# Patient Record
Sex: FEMALE | Race: OTHER | NOT HISPANIC OR LATINO | URBAN - METROPOLITAN AREA
[De-identification: names, ages, dates, MRNs, and addresses within clinical notes are randomized per-mention and may not be internally consistent; named-entity substitution may affect disease eponyms.]

---

## 2023-05-05 ENCOUNTER — OFFICE VISIT (OUTPATIENT)
Dept: PLASTIC SURGERY | Facility: CLINIC | Age: 23
End: 2023-05-05
Payer: COMMERCIAL

## 2023-05-05 DIAGNOSIS — O35.AXX1: Primary | ICD-10-CM

## 2023-05-05 PROCEDURE — 99203 OFFICE O/P NEW LOW 30 MIN: CPT | Mod: ,,, | Performed by: PLASTIC SURGERY

## 2023-05-05 PROCEDURE — 99203 PR OFFICE/OUTPT VISIT, NEW, LEVL III, 30-44 MIN: ICD-10-PCS | Mod: ,,, | Performed by: PLASTIC SURGERY

## 2023-05-05 NOTE — PROGRESS NOTES
During the prenatal visit, I reviewed with Zulema the general overview of cleft lip and palate care. Prior to her delivery, I have asked her to tour the hospital she plans to delivery her son and meet with the speech pathologists and the pediatricians who will take care of her baby. While touring the hospital, it would be good for Zulema to see the various bottles available to feed her son after he is born.    The timing of the lip repair is typically around 3-to-4 months of age depending on her son's overall health and weight gain. The priority for any  cleft patient is feeding and weight gain. There are a number of feeding systems/bottles available and I directed Zulema to the American Cleft Palate-Craniofacial Association's webpage to view the resources available to her. I have also entered a consult for our feeding specialist to meet with Zulema prior to the delivery of her boy.     Middle ear dysfunction is common in children with a cleft palate, and should her son have a cleft palate, our ENT surgeons will evaluate for middle ear dysfunction and the possible need for ear tubes. If tubes are needed, they will be placed at the time of the lip repair.     If her son is found to have a cleft palate, the palate surgery is performed around age 1.     We have a comprehensive Cleft Lip and Palate Team at the [facility]. As part of that team we have a craniofacial pediatrician, pediatric ENT, speech and language pathologists, a pediatric dentist, pediatric orthodontists, a , and a . Additionally, we have the full range of pediatric subspecialists should Zulema's son require additional care.     I asked her to contact me with questions pertaining to cleft lip and palate care, and to notify us when her son is born.

## 2023-05-08 ENCOUNTER — TELEPHONE (OUTPATIENT)
Dept: SPEECH THERAPY | Facility: HOSPITAL | Age: 23
End: 2023-05-08
Payer: COMMERCIAL

## 2023-05-15 ENCOUNTER — TELEPHONE (OUTPATIENT)
Dept: SPEECH THERAPY | Facility: HOSPITAL | Age: 23
End: 2023-05-15
Payer: COMMERCIAL

## 2023-05-25 ENCOUNTER — TELEPHONE (OUTPATIENT)
Dept: SPEECH THERAPY | Facility: HOSPITAL | Age: 23
End: 2023-05-25
Payer: COMMERCIAL

## 2023-05-30 ENCOUNTER — PATIENT MESSAGE (OUTPATIENT)
Dept: SPEECH THERAPY | Facility: HOSPITAL | Age: 23
End: 2023-05-30
Payer: COMMERCIAL

## 2023-05-30 ENCOUNTER — CLINICAL SUPPORT (OUTPATIENT)
Dept: SPEECH THERAPY | Facility: HOSPITAL | Age: 23
End: 2023-05-30
Attending: PLASTIC SURGERY
Payer: COMMERCIAL

## 2023-05-30 DIAGNOSIS — O35.AXX1: ICD-10-CM

## 2023-05-30 PROCEDURE — 99499 UNLISTED E&M SERVICE: CPT | Mod: 95,,, | Performed by: SPEECH-LANGUAGE PATHOLOGIST

## 2023-05-30 PROCEDURE — 99499 NO LOS: ICD-10-PCS | Mod: 95,,, | Performed by: SPEECH-LANGUAGE PATHOLOGIST

## 2023-05-30 NOTE — PROGRESS NOTES
The patient location is: [place]  The chief complaint leading to consultation is: pre- cleft lip    Visit type: audiovisual    Face to Face time with patient: 10 minutes  15 minutes of total time spent on the encounter, which includes face to face time and non-face to face time preparing to see the patient (eg, review of tests), Obtaining and/or reviewing separately obtained history, Documenting clinical information in the electronic or other health record, Independently interpreting results (not separately reported) and communicating results to the patient/family/caregiver, or Care coordination (not separately reported).         Each patient to whom he or she provides medical services by telemedicine is:  (1) informed of the relationship between the physician and patient and the respective role of any other health care provider with respect to management of the patient; and (2) notified that he or she may decline to receive medical services by telemedicine and may withdraw from such care at any time.    Notes:       Mrs. Angulo was seen for a pre-janae visit per Dr. Luis Meek, craniofacial surgeon, to provide information and support regarding  feeding, in particular, with infants with clefts.  She is expecting a boy (Stallings, due date 23) who has been found to have a L-sided cleft lip (unknown re: hard and soft palates) per [place].  They anticipate delivery at [facility] in [place].     Provided overview of oral structures and functioning during swallowing (and briefly, speech).  Breastfeeding not expected to be an option.     Reviewed the 4 commonly used specialty bottles (Tamy, Dr. Brown Specialty, Enfamil Cleft Palate Nurser, and [place]) and provided samples of each.  Provided the links to the ACPA and Dr. Ramos sites for videos re: how to use each (see links below).  Also provided the ACPA Family Resources link and briefly reviewed its contents.     ACPA:  American Cleft Palate  Association  Feeding:  https://acpa-f.org/acpa-family-services/family-resources/feeding-your-baby/feeding-your-baby-videos/  Family Resources:  https://acpa-f.org/acpa-family-services/family-resources/     Dr. Ramos Specialty Feeding System video  https://www.[employer].com/watch?v=AbByDg8JDrx     Reviewed general strategies for feeding, including  * elevated head position to help protect middle ears  * goal of completing feedings in 30 minutes or less  More specific strategies may be utilized once Stallings arrives and is assessed by inpatient SLPs.       Reviewed general course post discharge re: f/u with individual providers and Craniofacial Team.  Provided my contact information and advised that after discharge, I can see Chandrakant in person or virtually.  In the meantime, parents can contact with me with any additional questions that may arise.

## 2023-08-18 ENCOUNTER — HOSPITAL ENCOUNTER (EMERGENCY)
Facility: HOSPITAL | Age: 23
Discharge: HOME | End: 2023-08-19
Attending: EMERGENCY MEDICINE
Payer: COMMERCIAL

## 2023-08-18 DIAGNOSIS — I82.220 IVC THROMBOSIS: ICD-10-CM

## 2023-08-18 DIAGNOSIS — M54.50 LOW BACK PAIN, UNSPECIFIED BACK PAIN LATERALITY, UNSPECIFIED CHRONICITY, UNSPECIFIED WHETHER SCIATICA PRESENT: ICD-10-CM

## 2023-08-18 DIAGNOSIS — R06.02 SHORTNESS OF BREATH: ICD-10-CM

## 2023-08-18 DIAGNOSIS — R51.9 RECURRENT HEADACHE: Primary | ICD-10-CM

## 2023-08-18 PROBLEM — I82.90 ACUTE DEEP VEIN THROMBOSIS (DVT) OF NON-EXTREMITY VEIN: Status: ACTIVE | Noted: 2023-08-18

## 2023-08-18 LAB
ALBUMIN SERPL BCP-MCNC: 3.2 G/DL (ref 3.5–5.2)
ALP SERPL-CCNC: 92 U/L (ref 55–135)
ALT SERPL W/O P-5'-P-CCNC: 14 U/L (ref 10–44)
ANION GAP SERPL CALC-SCNC: 14 MMOL/L (ref 8–16)
AST SERPL-CCNC: 12 U/L (ref 10–40)
B-HCG UR QL: NEGATIVE
BACTERIA #/AREA URNS AUTO: ABNORMAL /HPF
BASOPHILS # BLD AUTO: 0.02 K/UL (ref 0–0.2)
BASOPHILS NFR BLD: 0.2 % (ref 0–1.9)
BILIRUB SERPL-MCNC: 0.4 MG/DL (ref 0.1–1)
BILIRUB UR QL STRIP: NEGATIVE
BUN SERPL-MCNC: 7 MG/DL (ref 6–20)
CALCIUM SERPL-MCNC: 9.5 MG/DL (ref 8.7–10.5)
CHLORIDE SERPL-SCNC: 98 MMOL/L (ref 95–110)
CLARITY UR REFRACT.AUTO: CLEAR
CO2 SERPL-SCNC: 26 MMOL/L (ref 23–29)
COLOR UR AUTO: YELLOW
CREAT SERPL-MCNC: 0.7 MG/DL (ref 0.5–1.4)
CTP QC/QA: YES
DIFFERENTIAL METHOD: ABNORMAL
EOSINOPHIL # BLD AUTO: 0.2 K/UL (ref 0–0.5)
EOSINOPHIL NFR BLD: 1.6 % (ref 0–8)
ERYTHROCYTE [DISTWIDTH] IN BLOOD BY AUTOMATED COUNT: 18.4 % (ref 11.5–14.5)
EST. GFR  (NO RACE VARIABLE): >60 ML/MIN/1.73 M^2
GLUCOSE SERPL-MCNC: 88 MG/DL (ref 70–110)
GLUCOSE UR QL STRIP: NEGATIVE
HCT VFR BLD AUTO: 29.7 % (ref 37–48.5)
HGB BLD-MCNC: 8.4 G/DL (ref 12–16)
HGB UR QL STRIP: ABNORMAL
IMM GRANULOCYTES # BLD AUTO: 0.11 K/UL (ref 0–0.04)
IMM GRANULOCYTES NFR BLD AUTO: 1.1 % (ref 0–0.5)
INR PPP: 1.2 (ref 0.8–1.2)
KETONES UR QL STRIP: NEGATIVE
LACTATE SERPL-SCNC: 1.3 MMOL/L (ref 0.5–2.2)
LEUKOCYTE ESTERASE UR QL STRIP: NEGATIVE
LYMPHOCYTES # BLD AUTO: 2.3 K/UL (ref 1–4.8)
LYMPHOCYTES NFR BLD: 24.1 % (ref 18–48)
MCH RBC QN AUTO: 21.4 PG (ref 27–31)
MCHC RBC AUTO-ENTMCNC: 28.3 G/DL (ref 32–36)
MCV RBC AUTO: 76 FL (ref 82–98)
MICROSCOPIC COMMENT: ABNORMAL
MONOCYTES # BLD AUTO: 0.7 K/UL (ref 0.3–1)
MONOCYTES NFR BLD: 7.2 % (ref 4–15)
NEUTROPHILS # BLD AUTO: 6.3 K/UL (ref 1.8–7.7)
NEUTROPHILS NFR BLD: 65.8 % (ref 38–73)
NITRITE UR QL STRIP: NEGATIVE
NRBC BLD-RTO: 0 /100 WBC
PH UR STRIP: 7 [PH] (ref 5–8)
PLATELET # BLD AUTO: 489 K/UL (ref 150–450)
PMV BLD AUTO: 8.8 FL (ref 9.2–12.9)
POTASSIUM SERPL-SCNC: 4.4 MMOL/L (ref 3.5–5.1)
PROT SERPL-MCNC: 8.2 G/DL (ref 6–8.4)
PROT UR QL STRIP: NEGATIVE
PROTHROMBIN TIME: 12.7 SEC (ref 9–12.5)
RBC # BLD AUTO: 3.92 M/UL (ref 4–5.4)
RBC #/AREA URNS AUTO: 12 /HPF (ref 0–4)
SODIUM SERPL-SCNC: 138 MMOL/L (ref 136–145)
SP GR UR STRIP: 1.01 (ref 1–1.03)
SQUAMOUS #/AREA URNS AUTO: 0 /HPF
URN SPEC COLLECT METH UR: ABNORMAL
WBC # BLD AUTO: 9.63 K/UL (ref 3.9–12.7)
WBC #/AREA URNS AUTO: 1 /HPF (ref 0–5)

## 2023-08-18 PROCEDURE — 87040 BLOOD CULTURE FOR BACTERIA: CPT | Mod: 59 | Performed by: PHYSICIAN ASSISTANT

## 2023-08-18 PROCEDURE — 96374 THER/PROPH/DIAG INJ IV PUSH: CPT

## 2023-08-18 PROCEDURE — 80053 COMPREHEN METABOLIC PANEL: CPT | Performed by: PHYSICIAN ASSISTANT

## 2023-08-18 PROCEDURE — 93005 ELECTROCARDIOGRAM TRACING: CPT

## 2023-08-18 PROCEDURE — 93010 EKG 12-LEAD: ICD-10-PCS | Mod: ,,, | Performed by: INTERNAL MEDICINE

## 2023-08-18 PROCEDURE — 87150 DNA/RNA AMPLIFIED PROBE: CPT | Mod: 59 | Performed by: PHYSICIAN ASSISTANT

## 2023-08-18 PROCEDURE — 85610 PROTHROMBIN TIME: CPT | Performed by: PHYSICIAN ASSISTANT

## 2023-08-18 PROCEDURE — 99285 EMERGENCY DEPT VISIT HI MDM: CPT | Mod: 25

## 2023-08-18 PROCEDURE — 99284 EMERGENCY DEPT VISIT MOD MDM: CPT | Mod: ,,, | Performed by: SURGERY

## 2023-08-18 PROCEDURE — 81001 URINALYSIS AUTO W/SCOPE: CPT | Performed by: PHYSICIAN ASSISTANT

## 2023-08-18 PROCEDURE — 83605 ASSAY OF LACTIC ACID: CPT | Performed by: PHYSICIAN ASSISTANT

## 2023-08-18 PROCEDURE — 81025 URINE PREGNANCY TEST: CPT | Performed by: PHYSICIAN ASSISTANT

## 2023-08-18 PROCEDURE — 99284 PR EMERGENCY DEPT VISIT,LEVEL IV: ICD-10-PCS | Mod: ,,, | Performed by: SURGERY

## 2023-08-18 PROCEDURE — 63600175 PHARM REV CODE 636 W HCPCS: Performed by: PHYSICIAN ASSISTANT

## 2023-08-18 PROCEDURE — 93010 ELECTROCARDIOGRAM REPORT: CPT | Mod: ,,, | Performed by: INTERNAL MEDICINE

## 2023-08-18 PROCEDURE — 85025 COMPLETE CBC W/AUTO DIFF WBC: CPT | Performed by: PHYSICIAN ASSISTANT

## 2023-08-18 RX ORDER — MORPHINE SULFATE 2 MG/ML
2 INJECTION, SOLUTION INTRAMUSCULAR; INTRAVENOUS
Status: COMPLETED | OUTPATIENT
Start: 2023-08-18 | End: 2023-08-18

## 2023-08-18 RX ORDER — WARFARIN SODIUM 5 MG/1
5 TABLET ORAL DAILY
COMMUNITY
Start: 2023-08-18 | End: 2023-09-03

## 2023-08-18 RX ORDER — ENOXAPARIN SODIUM 100 MG/ML
80 INJECTION SUBCUTANEOUS 2 TIMES DAILY
COMMUNITY
Start: 2023-08-18 | End: 2023-09-03 | Stop reason: SDUPTHER

## 2023-08-18 RX ORDER — LABETALOL 200 MG/1
200 TABLET, FILM COATED ORAL 2 TIMES DAILY
COMMUNITY
Start: 2023-07-12 | End: 2023-09-03

## 2023-08-18 RX ADMIN — IOHEXOL 65 ML: 350 INJECTION, SOLUTION INTRAVENOUS at 11:08

## 2023-08-18 RX ADMIN — MORPHINE SULFATE 2 MG: 2 INJECTION, SOLUTION INTRAMUSCULAR; INTRAVENOUS at 08:08

## 2023-08-18 NOTE — ED NOTES
Pt states would like a second opinion on blood clot, diagnosed by [facility]. Pts dad states, daughter gets short of breath quickly while walking. Pt had iv iron given.         Patient identifiers for Zulema Angulo 23 y.o. female checked and correct.  Chief Complaint   Patient presents with    blood clot     Dc'd from [facility] with vena cava blood clot on lovenox, wanting 2nd opinion     No past medical history on file.  Allergies reported: Review of patient's allergies indicates:  Not on File    Appearance: Pt awake, alert & oriented to person, place & time. Pt in no acute distress at present time. Pt is clean and well groomed with clothes appropriately fastened.   Skin: Skin warm, dry & intact. Color consistent with ethnicity. Mucous membranes moist. No breakdown or brusing noted.   Musculoskeletal: Patient moving all extremities well, no obvious swelling or deformities noted.   Respiratory: Respirations spontaneous, even, and non-labored. Visible chest rise noted. Airway is open and patent. No accessory muscle use noted.   Neurologic: Sensation is intact. Speech is clear and appropriate. Eyes open spontaneously, behavior appropriate to situation, follows commands, facial expression symmetrical, bilateral hand grasp equal and even, purposeful motor response noted.  Cardiac: All peripheral pulses present. No Bilateral lower extremity edema. Cap refill is <3 seconds.  Abdomen: Abdomen soft, non distended, non tender to palpation.  : Pt voids independently, denies dysuria, hematuria, frequency.

## 2023-08-18 NOTE — ED PROVIDER NOTES
Encounter Date: 8/18/2023       History     Chief Complaint   Patient presents with    blood clot     Dc'd from [facility] with vena cava blood clot on lovenox, wanting 2nd opinion     23-year-old female with no pertinent past medical history who is currently 4 weeks postpartum presents to ED with recurrent headaches, shortness of breath, lower back pain x2 weeks.  Discharged from [facility] [place] [facility] today after 6 day stay for extensive IVC blood clot. She is on heparin and coumadin at this time. She also reports intermittent fevers over the past week, Tmax of 103.  Shortness of breath is with exertion.  States that she cannot walk short distances without getting winded. CTA chest performed on 8/2/2023 negative for PE during her stay. Denies worsening of her symptoms since diagnosis. Still is having headaches and lower back pain. Denies abdominal pain, nausea, vomiting, visual changes, weakness, paresthesias, saddle anesthesia.    The history is provided by the patient.     Review of patient's allergies indicates:  Not on File  History reviewed. No pertinent past medical history.  History reviewed. No pertinent surgical history.  History reviewed. No pertinent family history.     Review of Systems   Constitutional:  Positive for appetite change and fever.   Eyes:  Negative for visual disturbance.   Respiratory:  Positive for shortness of breath.    Cardiovascular:  Negative for chest pain.   Gastrointestinal:  Negative for abdominal pain, nausea and vomiting.   Musculoskeletal:  Positive for back pain.   Neurological:  Positive for headaches. Negative for dizziness.       Physical Exam     Initial Vitals [08/18/23 1444]   BP Pulse Resp Temp SpO2   133/63 100 18 98.5 °F (36.9 °C) 98 %      MAP       --         Physical Exam    Nursing note and vitals reviewed.  Constitutional: She appears well-developed and well-nourished. She is not diaphoretic. No distress.   HENT:   Head: Normocephalic and atraumatic.    Nose: Nose normal.   Eyes: Conjunctivae and EOM are normal.   Neck: Neck supple.   Cardiovascular:  Normal rate, regular rhythm, normal heart sounds and intact distal pulses.           Pulmonary/Chest: Breath sounds normal. No respiratory distress.   Abdominal: Abdomen is soft. Bowel sounds are normal. She exhibits no distension. A surgical scar is present. There is no abdominal tenderness.    scar over lower abdomen.  No signs of infection including erythema, tenderness, drainage There is no guarding.   Musculoskeletal:      Cervical back: Neck supple.      Comments: No midline T spine or L spine ttp     Neurological: She is alert and oriented to person, place, and time. Gait normal.   Skin: No rash noted.   Psychiatric: She has a normal mood and affect. Her behavior is normal. Judgment and thought content normal.         ED Course   Procedures  Labs Reviewed   CBC W/ AUTO DIFFERENTIAL - Abnormal; Notable for the following components:       Result Value    RBC 3.92 (*)     Hemoglobin 8.4 (*)     Hematocrit 29.7 (*)     MCV 76 (*)     MCH 21.4 (*)     MCHC 28.3 (*)     RDW 18.4 (*)     Platelets 489 (*)     MPV 8.8 (*)     Immature Granulocytes 1.1 (*)     Immature Grans (Abs) 0.11 (*)     All other components within normal limits   COMPREHENSIVE METABOLIC PANEL - Abnormal; Notable for the following components:    Albumin 3.2 (*)     All other components within normal limits   URINALYSIS, REFLEX TO URINE CULTURE - Abnormal; Notable for the following components:    Occult Blood UA 1+ (*)     All other components within normal limits    Narrative:     Specimen Source->Urine   PROTIME-INR - Abnormal; Notable for the following components:    Prothrombin Time 12.7 (*)     All other components within normal limits   URINALYSIS MICROSCOPIC - Abnormal; Notable for the following components:    RBC, UA 12 (*)     All other components within normal limits    Narrative:     Specimen Source->Urine   CULTURE, BLOOD    CULTURE, BLOOD   LACTIC ACID, PLASMA   HIV 1 / 2 ANTIBODY   HEPATITIS C ANTIBODY   POCT URINE PREGNANCY     EKG Readings: (Independently Interpreted)   Rhythm: Sinus Tachycardia. Heart Rate: 104. Axis: Right Axis Deviation. Clinical Impression: Sinus Tachycardia       Imaging Results              X-Ray Chest PA And Lateral (Final result)  Result time 08/18/23 18:56:31      Final result by Olivier Thomas MD (08/18/23 18:56:31)                   Impression:      No acute process.      Electronically signed by: Olivier Thomas MD  Date:    08/18/2023  Time:    18:56               Narrative:    EXAMINATION:  XR CHEST PA AND LATERAL    CLINICAL HISTORY:  Shortness of breath    TECHNIQUE:  PA and lateral views of the chest were performed.    COMPARISON:  None    FINDINGS:  The trachea is unremarkable.  The cardiothymic silhouette is within normal limits.  The hilar structures are unremarkable.  There is no evidence of free air beneath the hemidiaphragms.  There are no pleural effusions.  There is no evidence of a pneumothorax.  There is no evidence of pneumomediastinum.  No airspace opacity is present.  The osseous structures are unremarkable.                                       MRV Brain Without Contrast (Final result)  Result time 08/18/23 18:52:52   Procedure changed from MRV Brain With & W/O Contrast     Final result by Olivier Thomas MD (08/18/23 18:52:52)                   Impression:      Normal MRV of the brain.    Electronically signed by resident: Laurent Briggs  Date:    08/18/2023  Time:    18:37    Electronically signed by: Olivier Thomas MD  Date:    08/18/2023  Time:    18:52               Narrative:    EXAMINATION:  MRV BRAIN WITHOUT CONTRAST    CLINICAL HISTORY:  Dural venous sinus thrombosis suspected;Recurrent headaches. dx with IVF clot on 8/12. concern for venous sinus thrombus;    TECHNIQUE:  Noncontrast 2-D time-of-flight MRV of the head with coronal and sagittal source imaging and 3-dimensional MIP  projection views.    COMPARISON:  None    FINDINGS:  The superior sagittal sinus is patent.  The inferior sagittal sinus is hypoplastic, likely a developmental variant.  The paired internal cerebral veins are patent.  The vein of Diaz and torcula Herophili are patent.  The straight sinus is patent.  The bilateral transverse and sigmoid dural venous sinuses are patent to the jugular foramen.                                       Medications   morphine injection 2 mg (2 mg Intravenous Given 8/18/23 2012)     Medical Decision Making  23-year-old female currently 4 weeks postpartum presents to ED with recurrent headaches, shortness of breath, lower back pain x2 weeks.  Nontoxic appearing. Hemodynamically stable. Oriented x3. Neuro intact.  Afebrile.  Exam as above.  Will obtain imaging and labs and reassess.    Differential diagnosis includes IVC thrombosis, venous sinus thrombus, symptomatic anemia, pulmonary embolism, sepsis    - Labs without leukocytosis.  Normal lactate.  Do not suspect sepsis at this time.  Blood cultures obtained and will be followed  - Hemoglobin of 8.4 though this is improved from yesterday (hemoglobin of 7.2)  - Urine does not appear to be infected.  Negative for leukocytes or nitrites.  1+ blood.  Patient denies urinary tract symptoms  - MRV brain negative for venous sinus thrombus or other acute intracranial abnormalities.  - Vascular surgery consulted and evaulated pt in the ed. Recommends CT PA to evaluate for PE.  Will sign out to attending physician Dr. Pinon pending CTA chest.  Dispo pending results. Vascular surgery plans to follow up patient in clinic if imaging is unremarkable.      Amount and/or Complexity of Data Reviewed  Independent Historian: parent and spouse     Details: Father/spouse is here with her providing part of history  External Data Reviewed: labs and notes.     Details: Discharged from [facility] today after being diagnosed with IVF clot on CT AP. On  ASHLY Slade heparin and Coumadin currently.  CT chest negative for PE. MRI L spine negative. CT head on 8/10 negative  Labs: ordered. Decision-making details documented in ED Course.  Radiology: ordered. Decision-making details documented in ED Course.  ECG/medicine tests:  Decision-making details documented in ED Course.    Risk  Prescription drug management.    .            ED Course as of 08/18/23 2231   Fri Aug 18, 2023   1745 Hemoglobin(!): 8.4 [HM]   1745 WBC: 9.63 [HM]   1806 Lactate, Tom: 1.3 [HM]      ED Course User Index  [HM] Maria Dolores Smith PA-C                    Clinical Impression:   Final diagnoses:  [R06.02] Shortness of breath  [R51.9] Recurrent headache (Primary)  [M54.50] Low back pain, unspecified back pain laterality, unspecified chronicity, unspecified whether sciatica present  [I82.220] IVC thrombosis               Maria Dolores Smith PA-C  08/18/23 2231

## 2023-08-19 VITALS
SYSTOLIC BLOOD PRESSURE: 128 MMHG | WEIGHT: 170 LBS | RESPIRATION RATE: 18 BRPM | HEIGHT: 58 IN | DIASTOLIC BLOOD PRESSURE: 61 MMHG | HEART RATE: 92 BPM | OXYGEN SATURATION: 96 % | BODY MASS INDEX: 35.68 KG/M2 | TEMPERATURE: 98 F

## 2023-08-19 PROCEDURE — 25500020 PHARM REV CODE 255: Performed by: PHYSICIAN ASSISTANT

## 2023-08-19 NOTE — HPI
Zulema Angulo is a 23 year old female who is 4 weeks postpartum who presents today for second opinion for her IVC thrombus. She presented to a hospital in [place] on 8/12 for migraines and back pain. 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 CTA PE was negative at that time. She was hospitalized and started on heparin which was transitioned to lovenox to bridge to coumadin. She was discharged this morning, however she was concerned because she was becoming very short of breath after walking about 100 ft and wanted a second opinion. She denies a history of blood clots. She only takes labetalol for her HTN during her pregnancy. She reports some lower leg swelling, but this has been present since her pregnancy and is not worse. She denies pain in her legs/feet as well as numbness and weakness.

## 2023-08-19 NOTE — ASSESSMENT & PLAN NOTE
23 year old female 4 weeks postpartum who presents today for second opinion for her IVC thrombus. Imaging at [facility] on 8/12 showed VC thrombus from the suprarenal IVC to the bilateral common illiac veins per the read (unable to view images). CTA PE was negative at that time. She presents today after discharge to obtain a second opinion. Also complaining of shortness of breath with ambulation. She is currently on lovenox for treatment.    - Recommend CTA PE if clinical concern for PE, although patient is already on treatment  - Will have patient follow up as an outpatient with images to review and discuss possible thrombectomy. No indication for acute intervention.  - Recommend continued anticoagulation  - Elevate legs and wear compression stockings to help with leg swelling.

## 2023-08-19 NOTE — PROGRESS NOTES
Received patient at [facility].    24 y/o F PMH recent IVC clot diagnosis already on acx pending vascular consult and imaging.    -MRV negative  -Vascular saw patient, rec CTA PE. No other interventions, they will see her in their clinic  -CTA PE negative  -Patient reassessed, she is stable, doing well, informed of plan. Family and patient in agreement, referral placed, discharged home, continue meds, strict return precautions given, she expressed understanding.

## 2023-08-19 NOTE — CONSULTS
[facility] - Emergency Dept  Vascular Surgery  Consult Note    Inpatient consult to Vascular Surgery  Consult performed by: Serina Cueva MD  Consult ordered by: Maria Dolores Smith PA-C        Subjective:     Chief Complaint/Reason for Admission: IVC thrombus    History of Present Illness: Zulema Angulo is a 23 year old female who is 4 weeks postpartum who presents today for second opinion for her IVC thrombus. She presented to a hospital in [place] on 8/12 for migraines and back pain. 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 CTA PE was negative at that time. She was hospitalized and started on heparin which was transitioned to lovenox to bridge to coumadin. She was discharged this morning, however she was concerned because she was becoming very short of breath after walking about 100 ft and wanted a second opinion. She denies a history of blood clots. She only takes labetalol for her HTN during her pregnancy. She reports some lower leg swelling, but this has been present since her pregnancy and is not worse. She denies pain in her legs/feet as well as numbness and weakness.            (Not in a hospital admission)      Review of patient's allergies indicates:  Not on File    History reviewed. No pertinent past medical history.  History reviewed. No pertinent surgical history.  Family History    None       Tobacco Use    Smoking status: Not on file    Smokeless tobacco: Not on file   Substance and Sexual Activity    Alcohol use: Not on file    Drug use: Not on file    Sexual activity: Not on file     Review of Systems   Constitutional:  Negative for chills, fatigue and fever.   Respiratory:  Positive for shortness of breath. Negative for cough.    Cardiovascular:  Positive for leg swelling. Negative for chest pain and palpitations.   Gastrointestinal:   Negative for abdominal pain.   Musculoskeletal:  Negative for back pain.   Skin:  Negative for color change and rash.   Neurological:  Positive for headaches. Negative for weakness and numbness.     Objective:     Vital Signs (Most Recent):  Temp: 98.8 °F (37.1 °C) (08/18/23 2007)  Pulse: 93 (08/18/23 2007)  Resp: 16 (08/18/23 2012)  BP: 115/73 (08/18/23 2007)  SpO2: 99 % (08/18/23 2007) Vital Signs (24h Range):  Temp:  [98.5 °F (36.9 °C)-99.3 °F (37.4 °C)] 98.8 °F (37.1 °C)  Pulse:  [] 93  Resp:  [16-20] 16  SpO2:  [98 %-99 %] 99 %  BP: (115-133)/(63-73) 115/73     Weight: 77.1 kg (170 lb)  Body mass index is 35.53 kg/m².      Physical Exam  Vitals and nursing note reviewed.   Constitutional:       General: She is not in acute distress.  HENT:      Head: Normocephalic and atraumatic.      Mouth/Throat:      Mouth: Mucous membranes are moist.   Cardiovascular:      Rate and Rhythm: Normal rate and regular rhythm.      Pulses: Normal pulses.   Pulmonary:      Effort: Pulmonary effort is normal. No respiratory distress.   Musculoskeletal:      Right lower leg: Edema (mild) present.      Left lower leg: Edema (mild) present.   Skin:     General: Skin is warm and dry.   Neurological:      General: No focal deficit present.      Mental Status: She is alert and oriented to person, place, and time.          Significant Labs:  CBC:   Recent Labs   Lab 08/18/23  1714   WBC 9.63   RBC 3.92*   HGB 8.4*   HCT 29.7*   *   MCV 76*   MCH 21.4*   MCHC 28.3*     CMP:   Recent Labs   Lab 08/18/23  1714   GLU 88   CALCIUM 9.5   ALBUMIN 3.2*   PROT 8.2      K 4.4   CO2 26   CL 98   BUN 7   CREATININE 0.7   ALKPHOS 92   ALT 14   AST 12   BILITOT 0.4     All pertinent labs from the last 24 hours have been reviewed.    Significant Diagnostics:  I have reviewed all pertinent imaging results/findings within the past 24 hours.    Assessment/Plan:     Acute deep vein thrombosis (DVT) of non-extremity vein  23 year old  female 4 weeks postpartum who presents today for second opinion for her IVC thrombus. Imaging at [facility] on 8/12 showed VC thrombus from the suprarenal IVC to the bilateral common illiac veins per the read (unable to view images). CTA PE was negative at that time. She presents today after discharge to obtain a second opinion. Also complaining of shortness of breath with ambulation. She is currently on lovenox for treatment.    - Recommend CTA PE if clinical concern for PE, although patient is already on treatment  - Will have patient follow up as an outpatient with images to review and discuss possible thrombectomy. No indication for acute intervention.  - Recommend continued anticoagulation  - Elevate legs and wear compression stockings to help with leg swelling.        Thank you for your consult. I will sign off. Please contact us if you have any additional questions.    Serina Cueva MD  Vascular Surgery  [facility] - Emergency Dept

## 2023-08-19 NOTE — SUBJECTIVE & OBJECTIVE
(Not in a hospital admission)      Review of patient's allergies indicates:  Not on File    History reviewed. No pertinent past medical history.  History reviewed. No pertinent surgical history.  Family History    None       Tobacco Use    Smoking status: Not on file    Smokeless tobacco: Not on file   Substance and Sexual Activity    Alcohol use: Not on file    Drug use: Not on file    Sexual activity: Not on file     Review of Systems   Constitutional:  Negative for chills, fatigue and fever.   Respiratory:  Positive for shortness of breath. Negative for cough.    Cardiovascular:  Positive for leg swelling. Negative for chest pain and palpitations.   Gastrointestinal:  Negative for abdominal pain.   Musculoskeletal:  Negative for back pain.   Skin:  Negative for color change and rash.   Neurological:  Positive for headaches. Negative for weakness and numbness.     Objective:     Vital Signs (Most Recent):  Temp: 98.8 °F (37.1 °C) (08/18/23 2007)  Pulse: 93 (08/18/23 2007)  Resp: 16 (08/18/23 2012)  BP: 115/73 (08/18/23 2007)  SpO2: 99 % (08/18/23 2007) Vital Signs (24h Range):  Temp:  [98.5 °F (36.9 °C)-99.3 °F (37.4 °C)] 98.8 °F (37.1 °C)  Pulse:  [] 93  Resp:  [16-20] 16  SpO2:  [98 %-99 %] 99 %  BP: (115-133)/(63-73) 115/73     Weight: 77.1 kg (170 lb)  Body mass index is 35.53 kg/m².      Physical Exam  Vitals and nursing note reviewed.   Constitutional:       General: She is not in acute distress.  HENT:      Head: Normocephalic and atraumatic.      Mouth/Throat:      Mouth: Mucous membranes are moist.   Cardiovascular:      Rate and Rhythm: Normal rate and regular rhythm.      Pulses: Normal pulses.   Pulmonary:      Effort: Pulmonary effort is normal. No respiratory distress.   Musculoskeletal:      Right lower leg: Edema (mild) present.      Left lower leg: Edema (mild) present.   Skin:     General: Skin is warm and dry.   Neurological:      General: No focal deficit present.      Mental Status: She  is alert and oriented to person, place, and time.          Significant Labs:  CBC:   Recent Labs   Lab 08/18/23  1714   WBC 9.63   RBC 3.92*   HGB 8.4*   HCT 29.7*   *   MCV 76*   MCH 21.4*   MCHC 28.3*     CMP:   Recent Labs   Lab 08/18/23  1714   GLU 88   CALCIUM 9.5   ALBUMIN 3.2*   PROT 8.2      K 4.4   CO2 26   CL 98   BUN 7   CREATININE 0.7   ALKPHOS 92   ALT 14   AST 12   BILITOT 0.4     All pertinent labs from the last 24 hours have been reviewed.    Significant Diagnostics:  I have reviewed all pertinent imaging results/findings within the past 24 hours.

## 2023-08-21 LAB
MRSA ID BY PCR: NEGATIVE
STAPH AUREUS ID BY PCR: NEGATIVE

## 2023-08-23 LAB
BACTERIA BLD CULT: ABNORMAL
BACTERIA BLD CULT: NORMAL

## 2023-09-02 ENCOUNTER — HOSPITAL ENCOUNTER (OUTPATIENT)
Facility: HOSPITAL | Age: 23
Discharge: HOME | End: 2023-09-03
Attending: EMERGENCY MEDICINE | Admitting: STUDENT IN AN ORGANIZED HEALTH CARE EDUCATION/TRAINING PROGRAM
Payer: COMMERCIAL

## 2023-09-02 DIAGNOSIS — R00.0 TACHYCARDIA: ICD-10-CM

## 2023-09-02 DIAGNOSIS — R50.9 FEVER, UNSPECIFIED FEVER CAUSE: Primary | ICD-10-CM

## 2023-09-02 DIAGNOSIS — R07.9 CHEST PAIN: ICD-10-CM

## 2023-09-02 DIAGNOSIS — I82.220 IVC THROMBOSIS: ICD-10-CM

## 2023-09-02 LAB
ALBUMIN SERPL BCP-MCNC: 3.6 G/DL (ref 3.5–5.2)
ALP SERPL-CCNC: 84 U/L (ref 55–135)
ALT SERPL W/O P-5'-P-CCNC: 22 U/L (ref 10–44)
ANION GAP SERPL CALC-SCNC: 11 MMOL/L (ref 8–16)
AST SERPL-CCNC: 16 U/L (ref 10–40)
B-HCG UR QL: NEGATIVE
BACTERIA #/AREA URNS AUTO: ABNORMAL /HPF
BASOPHILS # BLD AUTO: 0.01 K/UL (ref 0–0.2)
BASOPHILS NFR BLD: 0.1 % (ref 0–1.9)
BILIRUB SERPL-MCNC: 0.7 MG/DL (ref 0.1–1)
BILIRUB UR QL STRIP: NEGATIVE
BUN SERPL-MCNC: 8 MG/DL (ref 6–20)
CALCIUM SERPL-MCNC: 9.2 MG/DL (ref 8.7–10.5)
CHLORIDE SERPL-SCNC: 104 MMOL/L (ref 95–110)
CLARITY UR REFRACT.AUTO: ABNORMAL
CO2 SERPL-SCNC: 23 MMOL/L (ref 23–29)
COLOR UR AUTO: YELLOW
CREAT SERPL-MCNC: 0.8 MG/DL (ref 0.5–1.4)
CTP QC/QA: YES
DIFFERENTIAL METHOD: ABNORMAL
EOSINOPHIL # BLD AUTO: 0 K/UL (ref 0–0.5)
EOSINOPHIL NFR BLD: 0.5 % (ref 0–8)
ERYTHROCYTE [DISTWIDTH] IN BLOOD BY AUTOMATED COUNT: 20.8 % (ref 11.5–14.5)
EST. GFR  (NO RACE VARIABLE): >60 ML/MIN/1.73 M^2
GLUCOSE SERPL-MCNC: 98 MG/DL (ref 70–110)
GLUCOSE UR QL STRIP: NEGATIVE
HCT VFR BLD AUTO: 32.4 % (ref 37–48.5)
HCV AB SERPL QL IA: NORMAL
HGB BLD-MCNC: 9.6 G/DL (ref 12–16)
HGB UR QL STRIP: NEGATIVE
HIV 1+2 AB+HIV1 P24 AG SERPL QL IA: NORMAL
IMM GRANULOCYTES # BLD AUTO: 0.04 K/UL (ref 0–0.04)
IMM GRANULOCYTES NFR BLD AUTO: 0.5 % (ref 0–0.5)
INR PPP: 1.3 (ref 0.8–1.2)
KETONES UR QL STRIP: NEGATIVE
LEUKOCYTE ESTERASE UR QL STRIP: ABNORMAL
LYMPHOCYTES # BLD AUTO: 2.1 K/UL (ref 1–4.8)
LYMPHOCYTES NFR BLD: 24.9 % (ref 18–48)
MCH RBC QN AUTO: 23.1 PG (ref 27–31)
MCHC RBC AUTO-ENTMCNC: 29.6 G/DL (ref 32–36)
MCV RBC AUTO: 78 FL (ref 82–98)
MICROSCOPIC COMMENT: ABNORMAL
MONOCYTES # BLD AUTO: 0.7 K/UL (ref 0.3–1)
MONOCYTES NFR BLD: 8.2 % (ref 4–15)
NEUTROPHILS # BLD AUTO: 5.4 K/UL (ref 1.8–7.7)
NEUTROPHILS NFR BLD: 65.8 % (ref 38–73)
NITRITE UR QL STRIP: NEGATIVE
NRBC BLD-RTO: 0 /100 WBC
PH UR STRIP: 6 [PH] (ref 5–8)
PLATELET # BLD AUTO: 335 K/UL (ref 150–450)
PMV BLD AUTO: 8.9 FL (ref 9.2–12.9)
POTASSIUM SERPL-SCNC: 3.6 MMOL/L (ref 3.5–5.1)
PROT SERPL-MCNC: 7.6 G/DL (ref 6–8.4)
PROT UR QL STRIP: NEGATIVE
PROTHROMBIN TIME: 14.1 SEC (ref 9–12.5)
RBC # BLD AUTO: 4.16 M/UL (ref 4–5.4)
RBC #/AREA URNS AUTO: 1 /HPF (ref 0–4)
SARS-COV-2 RDRP RESP QL NAA+PROBE: NEGATIVE
SODIUM SERPL-SCNC: 138 MMOL/L (ref 136–145)
SP GR UR STRIP: 1.02 (ref 1–1.03)
SQUAMOUS #/AREA URNS AUTO: 14 /HPF
URN SPEC COLLECT METH UR: ABNORMAL
WBC # BLD AUTO: 8.26 K/UL (ref 3.9–12.7)
WBC #/AREA URNS AUTO: 7 /HPF (ref 0–5)

## 2023-09-02 PROCEDURE — 86803 HEPATITIS C AB TEST: CPT | Performed by: PHYSICIAN ASSISTANT

## 2023-09-02 PROCEDURE — 99285 EMERGENCY DEPT VISIT HI MDM: CPT | Mod: 25

## 2023-09-02 PROCEDURE — 85610 PROTHROMBIN TIME: CPT | Performed by: PHYSICIAN ASSISTANT

## 2023-09-02 PROCEDURE — U0002 COVID-19 LAB TEST NON-CDC: HCPCS | Performed by: PHYSICIAN ASSISTANT

## 2023-09-02 PROCEDURE — 93010 ELECTROCARDIOGRAM REPORT: CPT | Mod: ,,, | Performed by: INTERNAL MEDICINE

## 2023-09-02 PROCEDURE — 93010 EKG 12-LEAD: ICD-10-PCS | Mod: ,,, | Performed by: INTERNAL MEDICINE

## 2023-09-02 PROCEDURE — 93005 ELECTROCARDIOGRAM TRACING: CPT

## 2023-09-02 PROCEDURE — 80053 COMPREHEN METABOLIC PANEL: CPT | Performed by: PHYSICIAN ASSISTANT

## 2023-09-02 PROCEDURE — 87389 HIV-1 AG W/HIV-1&-2 AB AG IA: CPT | Performed by: PHYSICIAN ASSISTANT

## 2023-09-02 PROCEDURE — 85025 COMPLETE CBC W/AUTO DIFF WBC: CPT | Performed by: PHYSICIAN ASSISTANT

## 2023-09-02 PROCEDURE — 81025 URINE PREGNANCY TEST: CPT | Performed by: PHYSICIAN ASSISTANT

## 2023-09-02 PROCEDURE — 81001 URINALYSIS AUTO W/SCOPE: CPT | Performed by: PHYSICIAN ASSISTANT

## 2023-09-02 RX ADMIN — IOHEXOL 75 ML: 350 INJECTION, SOLUTION INTRAVENOUS at 11:09

## 2023-09-03 VITALS
RESPIRATION RATE: 18 BRPM | HEIGHT: 58 IN | DIASTOLIC BLOOD PRESSURE: 90 MMHG | OXYGEN SATURATION: 99 % | BODY MASS INDEX: 33.58 KG/M2 | TEMPERATURE: 98 F | WEIGHT: 160 LBS | HEART RATE: 96 BPM | SYSTOLIC BLOOD PRESSURE: 161 MMHG

## 2023-09-03 PROBLEM — R65.10 SIRS (SYSTEMIC INFLAMMATORY RESPONSE SYNDROME): Status: ACTIVE | Noted: 2023-09-03

## 2023-09-03 PROBLEM — I82.220 IVC THROMBOSIS: Status: ACTIVE | Noted: 2023-09-03

## 2023-09-03 PROBLEM — R03.0 ELEVATED BLOOD PRESSURE READING: Status: ACTIVE | Noted: 2023-09-03

## 2023-09-03 LAB
ESTIMATED AVG GLUCOSE: 85 MG/DL (ref 68–131)
HBA1C MFR BLD: 4.6 % (ref 4–5.6)
INR PPP: 1.3 (ref 0.8–1.2)
PROTHROMBIN TIME: 13.8 SEC (ref 9–12.5)
TROPONIN I SERPL DL<=0.01 NG/ML-MCNC: <0.006 NG/ML (ref 0–0.03)
TSH SERPL DL<=0.005 MIU/L-ACNC: 1.53 UIU/ML (ref 0.4–4)

## 2023-09-03 PROCEDURE — 25500020 PHARM REV CODE 255: Performed by: EMERGENCY MEDICINE

## 2023-09-03 PROCEDURE — 84484 ASSAY OF TROPONIN QUANT: CPT

## 2023-09-03 PROCEDURE — 99499 UNLISTED E&M SERVICE: CPT | Mod: ,,, | Performed by: HOSPITALIST

## 2023-09-03 PROCEDURE — 63600175 PHARM REV CODE 636 W HCPCS: Performed by: EMERGENCY MEDICINE

## 2023-09-03 PROCEDURE — 85610 PROTHROMBIN TIME: CPT

## 2023-09-03 PROCEDURE — 84443 ASSAY THYROID STIM HORMONE: CPT

## 2023-09-03 PROCEDURE — 83036 HEMOGLOBIN GLYCOSYLATED A1C: CPT

## 2023-09-03 PROCEDURE — 25000003 PHARM REV CODE 250

## 2023-09-03 PROCEDURE — 25000003 PHARM REV CODE 250: Performed by: STUDENT IN AN ORGANIZED HEALTH CARE EDUCATION/TRAINING PROGRAM

## 2023-09-03 PROCEDURE — 96372 THER/PROPH/DIAG INJ SC/IM: CPT | Performed by: EMERGENCY MEDICINE

## 2023-09-03 PROCEDURE — 99223 PR INITIAL HOSPITAL CARE,LEVL III: ICD-10-PCS | Mod: ,,, | Performed by: HOSPITALIST

## 2023-09-03 PROCEDURE — 99223 1ST HOSP IP/OBS HIGH 75: CPT | Mod: ,,, | Performed by: HOSPITALIST

## 2023-09-03 PROCEDURE — G0378 HOSPITAL OBSERVATION PER HR: HCPCS

## 2023-09-03 PROCEDURE — 99499 NO LOS: ICD-10-PCS | Mod: ,,, | Performed by: HOSPITALIST

## 2023-09-03 RX ORDER — IBUPROFEN 200 MG
16 TABLET ORAL
Status: DISCONTINUED | OUTPATIENT
Start: 2023-09-03 | End: 2023-09-03 | Stop reason: HOSPADM

## 2023-09-03 RX ORDER — NALOXONE HCL 0.4 MG/ML
0.02 VIAL (ML) INJECTION
Status: DISCONTINUED | OUTPATIENT
Start: 2023-09-03 | End: 2023-09-03 | Stop reason: HOSPADM

## 2023-09-03 RX ORDER — IBUPROFEN 200 MG
24 TABLET ORAL
Status: DISCONTINUED | OUTPATIENT
Start: 2023-09-03 | End: 2023-09-03 | Stop reason: HOSPADM

## 2023-09-03 RX ORDER — ENOXAPARIN SODIUM 100 MG/ML
1 INJECTION SUBCUTANEOUS EVERY 12 HOURS
Status: DISCONTINUED | OUTPATIENT
Start: 2023-09-03 | End: 2023-09-03

## 2023-09-03 RX ORDER — TALC
6 POWDER (GRAM) TOPICAL NIGHTLY PRN
Status: DISCONTINUED | OUTPATIENT
Start: 2023-09-03 | End: 2023-09-03 | Stop reason: HOSPADM

## 2023-09-03 RX ORDER — HYDROCODONE BITARTRATE AND ACETAMINOPHEN 5; 325 MG/1; MG/1
1 TABLET ORAL
Status: COMPLETED | OUTPATIENT
Start: 2023-09-03 | End: 2023-09-03

## 2023-09-03 RX ORDER — LABETALOL 100 MG/1
200 TABLET, FILM COATED ORAL EVERY 12 HOURS
Status: DISCONTINUED | OUTPATIENT
Start: 2023-09-03 | End: 2023-09-03

## 2023-09-03 RX ORDER — IPRATROPIUM BROMIDE 0.5 MG/2.5ML
0.5 SOLUTION RESPIRATORY (INHALATION) EVERY 6 HOURS
Status: DISCONTINUED | OUTPATIENT
Start: 2023-09-03 | End: 2023-09-03

## 2023-09-03 RX ORDER — SODIUM CHLORIDE 0.9 % (FLUSH) 0.9 %
10 SYRINGE (ML) INJECTION EVERY 12 HOURS PRN
Status: DISCONTINUED | OUTPATIENT
Start: 2023-09-03 | End: 2023-09-03 | Stop reason: HOSPADM

## 2023-09-03 RX ORDER — APIXABAN 5 MG (74)
KIT ORAL
Qty: 74 TABLET | Refills: 0 | Status: ON HOLD | OUTPATIENT
Start: 2023-09-03 | End: 2023-09-13 | Stop reason: HOSPADM

## 2023-09-03 RX ORDER — ACETAMINOPHEN 325 MG/1
650 TABLET ORAL EVERY 8 HOURS PRN
Status: DISCONTINUED | OUTPATIENT
Start: 2023-09-03 | End: 2023-09-03 | Stop reason: HOSPADM

## 2023-09-03 RX ORDER — LABETALOL 100 MG/1
200 TABLET, FILM COATED ORAL 2 TIMES DAILY
Status: DISCONTINUED | OUTPATIENT
Start: 2023-09-03 | End: 2023-09-03

## 2023-09-03 RX ORDER — GLUCAGON 1 MG
1 KIT INJECTION
Status: DISCONTINUED | OUTPATIENT
Start: 2023-09-03 | End: 2023-09-03 | Stop reason: HOSPADM

## 2023-09-03 RX ORDER — ACETAMINOPHEN 325 MG/1
650 TABLET ORAL
Status: COMPLETED | OUTPATIENT
Start: 2023-09-03 | End: 2023-09-03

## 2023-09-03 RX ORDER — ENOXAPARIN SODIUM 100 MG/ML
80 INJECTION SUBCUTANEOUS
Status: COMPLETED | OUTPATIENT
Start: 2023-09-03 | End: 2023-09-03

## 2023-09-03 RX ORDER — ENOXAPARIN SODIUM 100 MG/ML
80 INJECTION SUBCUTANEOUS EVERY 12 HOURS
Qty: 4 EACH | Refills: 0 | Status: SHIPPED | OUTPATIENT
Start: 2023-09-03 | End: 2023-09-03 | Stop reason: HOSPADM

## 2023-09-03 RX ADMIN — HYDROCODONE BITARTRATE AND ACETAMINOPHEN 1 TABLET: 5; 325 TABLET ORAL at 12:09

## 2023-09-03 RX ADMIN — ENOXAPARIN SODIUM 80 MG: 80 INJECTION SUBCUTANEOUS at 12:09

## 2023-09-03 RX ADMIN — ACETAMINOPHEN 650 MG: 325 TABLET ORAL at 12:09

## 2023-09-03 RX ADMIN — ACETAMINOPHEN 650 MG: 325 TABLET ORAL at 05:09

## 2023-09-03 RX ADMIN — APIXABAN 10 MG: 5 TABLET, FILM COATED ORAL at 11:09

## 2023-09-03 NOTE — H&P
"[facility] - Emergency Dept  [facility] Medicine  History & Physical    Patient Name: Zulema Angulo  MRN: [id number]  Patient Class: OP- Observation  Admission Date: 2023  Attending Physician: Sofy Salgado MD   Primary Care Provider: Abram Merrill MD         Patient information was obtained from patient, spouse/SO and ER records.     Subjective:     Principal Problem:<principal problem not specified>    Chief Complaint:   Chief Complaint   Patient presents with    Fever     States dx with a blood clot to vena cava x 2 weeks ago and was told to come to hospital if she started having fever" states dx at [facility] in [place] and started on blood thinners        HPI: Mrs Angulo is a 23 y.o. female presents who is a known case of IVC clot hat completed 7 days of Lovenox and was on warfarin (following up in hem/onc opd) who presented with worsening lower abdominal pain that radiates to the back in association with fever (100.0)  In the absence of cough, sputum, changes in urinary or bowel habits, or LOC nor seizures.  Patient denied SOB when questioned.  She was told by her treating team to present to ED in case of fever.  She describes her lower abdominal pain as constant and dull in  nature with a severity of 6/10 that is worsened upon movement and radiated to her lower leg.  Patient is able to ambulate.  Her most recent delivery was her last  that was birthed 6 weeks ago after which she got her tubes ligated.  For some reason her heme/onc team only supplied her Lovenox for 7 days and told her to continue on warfarin, however, her INR is subtherapeutic today.  She was scheduled  for an appointment with vascular surgery team  CT was done at the ED today which showed progression of the IVC clot. Vascular team has been consulted for the AM.    CT at the ED:  Extensive thrombus formation extending from the suprarenal IVC to the bilateral common iliac veins.  Interval progression of thrombus " formation within the left external iliac and common femoral veins.  Trace endometrial and endocervical fluid and enhancement were present on 08/12/2023 but appear more prominent today.  Although physiologic or normal postsurgical changes are possible, careful clinical correlation is advised to help exclude endometritis or other infectious process.  Neoplasm less likely.  Correlate clinically.  Stranding in the intrapelvic fat is possibly also postoperative or related to small vessel congestion secondary to the iliocaval DVT.  However, PID could present with some similar findings.  Correlate clinically.  Trace free fluid within the pelvis.      No past medical history on file.    No past surgical history on file.    Review of patient's allergies indicates:  No Known Allergies    No current facility-administered medications on file prior to encounter.     Current Outpatient Medications on File Prior to Encounter   Medication Sig    labetaloL (NORMODYNE) 200 MG tablet Take 200 mg by mouth 2 (two) times daily.    warfarin (COUMADIN) 5 MG tablet Take 5 mg by mouth Daily.    [DISCONTINUED] enoxaparin (LOVENOX) 80 mg/0.8 mL Syrg Inject 80 mg into the skin 2 (two) times daily.     Family History    None       Tobacco Use    Smoking status: Not on file    Smokeless tobacco: Not on file   Substance and Sexual Activity    Alcohol use: Not on file    Drug use: Not on file    Sexual activity: Not on file     Review of Systems   Constitutional:  Negative for activity change.   Respiratory:  Negative for apnea, choking, chest tightness, shortness of breath and wheezing.    Cardiovascular:  Negative for chest pain, palpitations and leg swelling.   Gastrointestinal:  Positive for abdominal pain (lower abdominal pain, 6/10, dull.). Negative for constipation, diarrhea and vomiting.   Genitourinary:  Negative for dysuria, flank pain, frequency, hematuria and urgency.   Musculoskeletal:  Positive for back pain.   Neurological:  Negative.    Psychiatric/Behavioral: Negative.       Objective:     Vital Signs (Most Recent):  Temp: 99.6 °F (37.6 °C) (09/03/23 0043)  Pulse: 78 (09/03/23 0430)  Resp: 15 (09/03/23 0430)  BP: 124/73 (09/03/23 0430)  SpO2: 97 % (09/03/23 0430) Vital Signs (24h Range):  Temp:  [98.5 °F (36.9 °C)-99.6 °F (37.6 °C)] 99.6 °F (37.6 °C)  Pulse:  [] 78  Resp:  [14-20] 15  SpO2:  [97 %-100 %] 97 %  BP: (115-130)/(60-84) 124/73     Weight: 72.6 kg (160 lb)  Body mass index is 33.44 kg/m².     Physical Exam  HENT:      Head: Normocephalic and atraumatic.   Eyes:      Pupils: Pupils are equal, round, and reactive to light.   Cardiovascular:      Rate and Rhythm: Normal rate and regular rhythm.      Pulses: Normal pulses.      Heart sounds: Normal heart sounds. No murmur heard.  Pulmonary:      Effort: Pulmonary effort is normal. No respiratory distress.      Breath sounds: Normal breath sounds. No wheezing.   Abdominal:      General: Abdomen is flat. Bowel sounds are normal.      Tenderness: There is abdominal tenderness (lower abdominal pain).   Skin:     General: Skin is warm.   Neurological:      General: No focal deficit present.      Mental Status: She is alert and oriented to person, place, and time. Mental status is at baseline.   Psychiatric:         Mood and Affect: Mood normal.         Behavior: Behavior normal.         Thought Content: Thought content normal.         Judgment: Judgment normal.              CRANIAL NERVES     CN III, IV, VI   Pupils are equal, round, and reactive to light.       Significant Labs: All pertinent labs within the past 24 hours have been reviewed.    Significant Imaging: I have reviewed all pertinent imaging results/findings within the past 24 hours.    Assessment/Plan:     Elevated blood pressure reading        Acute deep vein thrombosis (DVT) of non-extremity vein  Patient will be placed on enoxaparin 0.8 sc bid    Plan:  Vascular consultation ordered  Continue enoxaparin 0.8 sc  bid  Hold warfarin           VTE Risk Mitigation (From admission, onward)         Ordered     enoxaparin injection 80 mg  Every 12 hours         09/03/23 0453     IP VTE HIGH RISK PATIENT  Once         09/03/23 0453     Place sequential compression device  Until discontinued         09/03/23 0453                       On 09/03/2023, patient should be placed in hospital observation services under my care in collaboration with Dr. Dayday Sargent.    Maria M Ron MD  Department of Hospital Medicine  [facility] - Emergency Dept

## 2023-09-03 NOTE — CONSULTS
[facility]  Vascular Surgery  Consult Note    Inpatient consult to Vascular Surgery  Consult performed by: Kip Barajas MD  Consult ordered by: Sofy Salgado MD        Subjective:     Chief Complaint/Reason for Admission: IVC thrombus    History of Present Illness: Zulema Angulo is a 23 year old female who is 6 weeks postpartum who presents to the emergency department with complaint of lower abdominal pain, migraines, and subjective fever (<100.4).     She initially presented to a hospital in [place] on 8/12 for migraines and back pain. 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 CTA PE was negative at that time. She was hospitalized and started on heparin which was transitioned to lovenox to bridge to coumadin (due to plan for breast feeding).     Since previous evaluation, the patient denies any leg swelling or difficulty breathing. A repeat CT scan was performed today which shows extension of thrombus into left external iliac and common femoral veins. Her INR has been subtherapeutic at outpatient follow up, her dose of coumadin was increased, INR remains subtherapeutic at 1.4 today. The patient is no longer breast feeding.     Denies numbness or weakness of lower extremities.       (Not in a hospital admission)      Review of patient's allergies indicates:  No Known Allergies    No past medical history on file.  No past surgical history on file.  Family History    None       Tobacco Use    Smoking status: Not on file    Smokeless tobacco: Not on file   Substance and Sexual Activity    Alcohol use: Not on file    Drug use: Not on file    Sexual activity: Not on file     Review of Systems   Constitutional:  Positive for fever (subjective, no value >100). Negative for chills and fatigue.   Respiratory:  Negative for shortness of breath.     Cardiovascular:  Negative for chest pain.   Gastrointestinal:  Positive for abdominal pain (lower abdomen).   Skin:  Negative for color change and wound.   Neurological:  Positive for headaches.   All other systems reviewed and are negative.    Objective:     Vital Signs (Most Recent):  Temp: 98.1 °F (36.7 °C) (09/03/23 0800)  Pulse: 87 (09/03/23 0732)  Resp: 18 (09/03/23 0732)  BP: 112/68 (09/03/23 0732)  SpO2: 99 % (09/03/23 0732) Vital Signs (24h Range):  Temp:  [98.1 °F (36.7 °C)-99.6 °F (37.6 °C)] 98.1 °F (36.7 °C)  Pulse:  [] 87  Resp:  [13-20] 18  SpO2:  [94 %-100 %] 99 %  BP: (109-130)/(60-84) 112/68     Weight: 72.6 kg (160 lb)  Body mass index is 33.44 kg/m².      Physical Exam  Constitutional:       Appearance: Normal appearance.   HENT:      Head: Normocephalic and atraumatic.   Cardiovascular:      Rate and Rhythm: Normal rate and regular rhythm.   Pulmonary:      Effort: Pulmonary effort is normal.   Abdominal:      General: Abdomen is flat.      Tenderness: There is no abdominal tenderness.   Musculoskeletal:         General: No swelling.      Left lower leg: No edema.   Skin:     General: Skin is warm.      Capillary Refill: Capillary refill takes less than 2 seconds.   Neurological:      General: No focal deficit present.      Mental Status: She is alert and oriented to person, place, and time.          Significant Labs:  Recent Lab Results  (Last 5 results in the past 24 hours)        09/03/23  0841   09/03/23  0504   09/02/23  1950   09/02/23  1945   09/02/23  1942        Appearance, UA       Hazy         Bacteria, UA       Few         Bilirubin (UA)       Negative         Color, UA       Yellow         Estimated Avg Glucose   85             Glucose, UA       Negative         Hemoglobin A1C External   4.6  Comment: ADA Screening Guidelines:  5.7-6.4%  Consistent with prediabetes  >or=6.5%  Consistent with diabetes    High levels of fetal hemoglobin interfere with the HbA1C  assay.  Heterozygous hemoglobin variants (HbS, HgC, etc)do  not significantly interfere with this assay.   However, presence of multiple variants may affect accuracy.               INR 1.3  Comment: Coumadin Therapy:  2.0 - 3.0 for INR for all indicators except mechanical heart valves  and antiphospholipid syndromes which should use 2.5 - 3.5.                 Ketones, UA       Negative         Leukocytes, UA       Trace         Microscopic Comment       SEE COMMENT  Comment: Other formed elements not mentioned in the report are not   present in the microscopic examination.            NITRITE UA       Negative         Occult Blood UA       Negative         pH, UA       6.0         Preg Test, Ur     Negative           Protein, UA       Negative  Comment: Recommend a 24 hour urine protein or a urine   protein/creatinine ratio if globulin induced proteinuria is  clinically suspected.           [place] 13.8                Acceptable     Yes           RBC, UA       1         SARS-CoV-2 RNA, Amplification, Qual         Negative  Comment: This test utilizes isothermal nucleic acid amplification technology   to   detect the SARS-CoV-2 RdRp nucleic acid segment. The analytical   sensitivity   (limit of detection) is 500 copies/swab.     A POSITIVE result is indicative of the presence of SARS-CoV-2 RNA;   clinical   correlation with patient history and other diagnostic information is   necessary to determine patient infection status.    A NEGATIVE result means that SARS-CoV-2 nucleic acids are not present   above   the limit of detection. A NEGATIVE result should be treated as   presumptive.   It does not rule out the possibility of COVID-19 and should not be   the sole   basis for treatment decisions. If COVID-19 is strongly suspected   based on   clinical and exposure history, re-testing using an alternate   molecular assay   should be considered.     This test is only for use under the Food and Drug Administration s    Emergency   Use Authorization (EUA).     Commercial kits are provided by [employer]. Performance   characteristics of the EUA have been independently verified by   [facility] Department of Pathology and Laboratory Medicine.   _________________________________________________________________   The authorized Fact Sheet for Healthcare Providers and the authorized   Fact   Sheet for Patients of the ID NOW COVID-19 are available on the FDA   website:   https://www.fda.gov/media/968142/download   https://www.fda.gov/media/006563/download         Specific Gravity, UA       1.020         Specimen UA       Urine, Clean Catch         Squam Epithel, UA       14         Troponin I   <0.006  Comment: The reference interval for Troponin I represents the 99th percentile   cutoff   for our facility and is consistent with 3rd generation assay   performance.               TSH   1.530             WBC, UA       7                                Significant Diagnostics:  I have reviewed all pertinent imaging results/findings within the past 24 hours.    Assessment/Plan:     * IVC thrombosis  [age] year old woman now 6 weeks postpartum with history of suprarenal IVC thrombus extending down to the right common iliac and left common femoral vein.  At this point, the thrombus is subacute, greater than 2 weeks since initial diagnosis.  Extension of thrombus from previous evaluation as a result of subtherapeutic range of INR while on Coumadin.      No exam or history findings concerning for limb threat    Recommend transitioning anticoagulation to Eliquis, 10 mg b.i.d. for 1 week followed by 5 mg b.i.d. for at least 3 months.      Patient to follow up with Dr. Pate in 1-2 weeks with a repeat CT scan to evaluate thrombus burden  No indication for acute intervention at this time   Rest of care per primary team        Thank you for your consult. I will sign off. Please contact us if you have any additional  questions.    ELEUTERIO GARLAND MD  Vascular Surgery  [facility] Dept

## 2023-09-03 NOTE — ASSESSMENT & PLAN NOTE
[age] year old woman now 6 weeks postpartum with history of suprarenal IVC thrombus extending down to the right common iliac and left common femoral vein.  At this point, the thrombus is subacute, greater than 2 weeks since initial diagnosis.  Extension of thrombus from previous evaluation as a result of subtherapeutic range of INR while on Coumadin.      No exam or history findings concerning for limb threat    Recommend transitioning anticoagulation to Eliquis, 10 mg b.i.d. for 1 week followed by 5 mg b.i.d. for at least 3 months.      Patient to follow up with Dr. Ahn in 1-2 weeks with a repeat CT scan to evaluate thrombus burden  No indication for acute intervention at this time   Rest of care per primary team

## 2023-09-03 NOTE — SUBJECTIVE & OBJECTIVE
No past medical history on file.    No past surgical history on file.    Review of patient's allergies indicates:  No Known Allergies    No current facility-administered medications on file prior to encounter.     Current Outpatient Medications on File Prior to Encounter   Medication Sig    labetaloL (NORMODYNE) 200 MG tablet Take 200 mg by mouth 2 (two) times daily.    warfarin (COUMADIN) 5 MG tablet Take 5 mg by mouth Daily.    [DISCONTINUED] enoxaparin (LOVENOX) 80 mg/0.8 mL Syrg Inject 80 mg into the skin 2 (two) times daily.     Family History    None       Tobacco Use    Smoking status: Not on file    Smokeless tobacco: Not on file   Substance and Sexual Activity    Alcohol use: Not on file    Drug use: Not on file    Sexual activity: Not on file     Review of Systems   Constitutional:  Negative for activity change.   Respiratory:  Negative for apnea, choking, chest tightness, shortness of breath and wheezing.    Cardiovascular:  Negative for chest pain, palpitations and leg swelling.   Gastrointestinal:  Positive for abdominal pain (lower abdominal pain, 6/10, dull.). Negative for constipation, diarrhea and vomiting.   Genitourinary:  Negative for dysuria, flank pain, frequency, hematuria and urgency.   Musculoskeletal:  Positive for back pain.   Neurological: Negative.    Psychiatric/Behavioral: Negative.       Objective:     Vital Signs (Most Recent):  Temp: 99.6 °F (37.6 °C) (09/03/23 0043)  Pulse: 78 (09/03/23 0430)  Resp: 15 (09/03/23 0430)  BP: 124/73 (09/03/23 0430)  SpO2: 97 % (09/03/23 0430) Vital Signs (24h Range):  Temp:  [98.5 °F (36.9 °C)-99.6 °F (37.6 °C)] 99.6 °F (37.6 °C)  Pulse:  [] 78  Resp:  [14-20] 15  SpO2:  [97 %-100 %] 97 %  BP: (115-130)/(60-84) 124/73     Weight: 72.6 kg (160 lb)  Body mass index is 33.44 kg/m².     Physical Exam  HENT:      Head: Normocephalic and atraumatic.   Eyes:      Pupils: Pupils are equal, round, and reactive to light.   Cardiovascular:      Rate and  Rhythm: Normal rate and regular rhythm.      Pulses: Normal pulses.      Heart sounds: Normal heart sounds. No murmur heard.  Pulmonary:      Effort: Pulmonary effort is normal. No respiratory distress.      Breath sounds: Normal breath sounds. No wheezing.   Abdominal:      General: Abdomen is flat. Bowel sounds are normal.      Tenderness: There is abdominal tenderness (lower abdominal pain).   Skin:     General: Skin is warm.   Neurological:      General: No focal deficit present.      Mental Status: She is alert and oriented to person, place, and time. Mental status is at baseline.   Psychiatric:         Mood and Affect: Mood normal.         Behavior: Behavior normal.         Thought Content: Thought content normal.         Judgment: Judgment normal.              CRANIAL NERVES     CN III, IV, VI   Pupils are equal, round, and reactive to light.       Significant Labs: All pertinent labs within the past 24 hours have been reviewed.    Significant Imaging: I have reviewed all pertinent imaging results/findings within the past 24 hours.

## 2023-09-03 NOTE — PHARMACY MED REC
"Admission Medication History     The home medication history was taken by Sal Pérez.    You may go to "Admission" then "Reconcile Home Medications" tabs to review and/or act upon these items.     The home medication list has been updated by the Pharmacy department.   Please read ALL comments highlighted in yellow.   Please address this information as you see fit.    Feel free to contact us if you have any questions or require assistance.      PATIENT WAS TAKING LABETALOL 200 MG AND WARFARIN 5 MG PRIOR TO ED VISIT. MD DISCONTINUED AT DISCHARGE.        Potential issues to be addressed PRIOR TO DISCHARGE  Patient requires education regarding drug therapies     Sal Pérez  EXT [id number]                .          "

## 2023-09-03 NOTE — ED NOTES
Report received from [facility] paramedic. Assumed care of pt at this time. VSS, RR even and unlabored. Resting in bed comfortably. No voiced compaints of pain or discomfort at this time. Safety protocols remain. Verified lines/leads attatched to patient at this time.      General: Awake and alert:  Neck: Supple  Respiratory: Nonlabored respirations. No audible wheeze. Speaking in full sentences.  Cardiac: Well-perfused. No acrocyanosis.  Abdomen: Supple  Neurological: Moves all extremities symmetrically and equally. Answers questions appropriately.

## 2023-09-03 NOTE — ASSESSMENT & PLAN NOTE
Patient will be placed on enoxaparin 0.8 sc bid    Plan:  Vascular consultation ordered  Continue enoxaparin 0.8 sc bid  Hold warfarin

## 2023-09-03 NOTE — PLAN OF CARE
[facility]  Initial Discharge Assessment       Primary Care Provider: Abram Merrill MD    Admission Diagnosis: Fever, unspecified fever cause [R50.9]    Admission Date: 9/2/2023  Expected Discharge Date: 9/3/2023    Pt stated she is independent with her ADL's and ambulation and does not require assistance or equipment.    Pt spouse at bedside.  Pt to d/c home with no needs when ready    Transition of Care Barriers: (P) None    Payor: BLUE CROSS BLUE SHIELD / Plan: BCBS ALL OUT OF STATE / Product Type: PPO /     Extended Emergency Contact Information  Primary Emergency Contact: Josias Angulo  Mobile Phone: [phone]  Relation: Spouse  Preferred language: English   needed? No    Discharge Plan A: (P) Home  Discharge Plan B: (P) Home    No Pharmacies Listed    Initial Assessment (most recent)       Adult Discharge Assessment - 09/03/23 1034          Discharge Assessment    Assessment Type Discharge Planning Assessment (P)      Confirmed/corrected address, phone number and insurance Yes (P)      Confirmed Demographics Correct on Facesheet (P)      Source of Information patient (P)      Reason For Admission IVC thrombosis (P)      People in Home spouse;child(frances), dependent (P)      Facility Arrived From: home (P)      Do you expect to return to your current living situation? Yes (P)      Do you have help at home or someone to help you manage your care at home? No (P)      Prior to hospitilization cognitive status: Alert/Oriented;No Deficits (P)      Current cognitive status: Alert/Oriented;No Deficits (P)      Home Accessibility stairs to enter home (P)      Number of Stairs, Main Entrance one (P)      Home Layout Able to live on 1st floor (P)      Equipment Currently Used at Home none (P)      Patient currently being followed by outpatient case management? No (P)      Do you currently have service(s) that help you manage your care at home? No (P)      Do you have any problems affording  any of your prescribed medications? No (P)      Is the patient taking medications as prescribed? yes (P)      Who is going to help you get home at discharge? fmaily/friends (P)      How do you get to doctors appointments? car, drives self;family or friend will provide (P)      Are you on dialysis? No (P)      Do you take coumadin? Yes (P)      Who monitors your labs? Dr Tompkins - 1 x week (P)      DME Needed Upon Discharge  none (P)      Discharge Plan discussed with: Patient (P)      Transition of Care Barriers None (P)      Discharge Plan A Home (P)      Discharge Plan B Home (P)         Transportation Needs    In the past 12 months, has lack of transportation kept you from medical appointments or from getting medications? No (P)      In the past 12 months, has lack of transportation kept you from meetings, work, or from getting things needed for daily living? No (P)         Food Insecurity    Within the past 12 months, you worried that your food would run out before you got the money to buy more. Never true (P)      Within the past 12 months, the food you bought just didn't last and you didn't have money to get more. Never true (P)         Social Connections    In a typical week, how many times do you talk on the phone with family, friends, or neighbors? More than three times a week (P)      How often do you get together with friends or relatives? More than three times a week (P)      How often do you attend Catholic or Mu-ism services? More than 4 times per year (P)      Do you belong to any clubs or organizations such as Catholic groups, unions, fraternal or athletic groups, or school groups? No (P)      How often do you attend meetings of the clubs or organizations you belong to? Never (P)      Are you , , , , never , or living with a partner?  (P)         Alcohol Use    Q1: How often do you have a drink containing alcohol? Never (P)      Q2: How many drinks  containing alcohol do you have on a typical day when you are drinking? Patient does not drink (P)      Q3: How often do you have six or more drinks on one occasion? Never (P)         OTHER    Name(s) of People in Home spouse Josias and dependent children 18mo and 6weeks (P)                       Lillie Benson CD, MSW, LMSW, RSW   Case Management  [facility]  Email: [email or web address]

## 2023-09-03 NOTE — FIRST PROVIDER EVALUATION
" Emergency Department TeleTriage Encounter Note      CHIEF COMPLAINT    Chief Complaint   Patient presents with    Fever     States dx with a blood clot to vena cava x 2 weeks ago and was told to come to hospital if she started having fever" states dx at [facility] in [place] and started on blood thinners       VITAL SIGNS   Initial Vitals [09/02/23 1905]   BP Pulse Resp Temp SpO2   118/84 (!) 113 18 98.5 °F (36.9 °C) 99 %      MAP       --            ALLERGIES    Review of patient's allergies indicates:  No Known Allergies    PROVIDER TRIAGE NOTE  Patient was dx with clot in the vena cava and is on anticoagulants. She has now has pain in the lower left back and left leg. She had temp 100 degrees today. No chest pain or shortness of breath. Reports headache. Reports both legs feel weak when she walks.       ORDERS  Labs Reviewed   HIV 1 / 2 ANTIBODY   HEPATITIS C ANTIBODY       ED Orders (720h ago, onward)      Start Ordered     Status Ordering Provider    09/02/23 1907 09/02/23 1906  HIV 1/2 Ag/Ab (4th Gen)  STAT         Ordered KENDALL ANGULO    09/02/23 1907 09/02/23 1906  Hepatitis C Antibody  STAT         Ordered KENDALL ANGULO              Virtual Visit Note: The provider triage portion of this emergency department evaluation and documentation was performed via linkedFA, a HIPAA-compliant telemedicine application, in concert with a tele-presenter in the room. A face to face patient evaluation with one of my colleagues will occur once the patient is placed in an emergency department room.      DISCLAIMER: This note was prepared with [employer] voice recognition transcription software. Garbled syntax, mangled pronouns, and other bizarre constructions may be attributed to that software system.    "

## 2023-09-03 NOTE — ED TRIAGE NOTES
"Zulema ZACKERY Angulo, a 23 y.o. female presents to the ED w/ complaint of fever, increased SOB and dizziness upon walking, and L side pain radiating to L leg. 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     Triage note:  Chief Complaint   Patient presents with    Fever     States dx with a blood clot to vena cava x 2 weeks ago and was told to come to hospital if she started having fever" states dx at [facility] in [place] and started on blood thinners     Review of patient's allergies indicates:  No Known Allergies  No past medical history on file.    "

## 2023-09-03 NOTE — PROVIDER PROGRESS NOTES - EMERGENCY DEPT.
Encounter Date: 9/2/2023    ED Physician Progress Notes            Received patient in sign-out.  CT of abdomen/pelvis was notable for patient's known IVC thrombus but with no extension into the external iliacs.  Given this worsening thrombus burden despite patient being on outpatient anticoagulation, feel that she requires inpatient admission further evaluation and anticoagulation management.  Discussed with the patient.  Discussed with hospital medicine who will admit the patient for further treatment.  Hospital medicine requested a vascular surgery consult which the order was placed.    Electronically signed by:  Joan Yates MD  9/3/2023

## 2023-09-03 NOTE — HOSPITAL COURSE
23-year-old female with recent diagnosis of a vena cava thrombus following  in mid July presents with a fever. She was taking Lovenox for 7 days but has recently transitioned to Warfarin. She came to the ED with a low-grade fever per her instructions if she develops fever in the setting of acute DVT. During her stay, her Warfarin levels were found to be sub-therapeutic. Repeat CT today showed extensive thrombus formation extending from the suprarenal IVC to the bilateral common iliac veins.  Interval progression of thrombus formation within the left external iliac and common femoral veins. Vascular surgery was consulted and evaluated her. Vascular decided to switch her from Warfarin to Eliquis. She is currently scheduled to follow up with Vascular surgery outpatient in a week and future plans for thrombectomy.

## 2023-09-03 NOTE — DISCHARGE INSTRUCTIONS
Follow up with your doctor and vascular as previously scheduled.    Return to ED for abdominal pain, vomiting, persistent fever or any other concerns.

## 2023-09-03 NOTE — ED PROVIDER NOTES
"Encounter Date: 2023       History     Chief Complaint   Patient presents with    Fever     States dx with a blood clot to vena cava x 2 weeks ago and was told to come to hospital if she started having fever" states dx at [facility] in [place] and started on blood thinners     HPI  23-year-old female with recent diagnosis of a vena cava thrombus following  in mid July presents with a fever.  Per patient's mother she has been on Lovenox in his transitioning to Coumadin and generally has been doing better but today noted a fever and some left lower quadrant abdominal pain and a were concerned because they were told to come back if the patient had a fever.  Patient states the left-sided abdominal pain was similar to the symptoms she had prior to being diagnosed with the vena cava thrombus.  Patient states she is been told she is a carrier for factor 5 Leiden.  Per mother pt was on lovenox and is transitioning to coumadin. Pt has an appt with vascular surgery in the next week or so  Has had some mild nausea but no emesis.  No other focal symptoms including headache, sore throat, cough, shortness of breath, dysuria and vaginal discharge.  She is no longer breastfeeding.  No breast tenderness.  No other sick at home.    Review of patient's allergies indicates:  No Known Allergies    PMH: vena cava thrombus, iron deficiency anemia  PSH:  and tubal ligation, 2023    No family history on file.     Review of Systems   Constitutional:  Positive for fever. Negative for fatigue.   HENT:  Negative for sore throat.    Respiratory:  Negative for cough and shortness of breath.    Cardiovascular:  Negative for chest pain and leg swelling.   Gastrointestinal:  Positive for abdominal pain and nausea. Negative for abdominal distention and vomiting.   Genitourinary:  Negative for dysuria, flank pain, urgency, vaginal bleeding and vaginal discharge.   Musculoskeletal:  Negative for back pain.   Skin:  " Negative for rash.       Physical Exam     Initial Vitals [23 1905]   BP Pulse Resp Temp SpO2   118/84 (!) 113 18 98.5 °F (36.9 °C) 99 %      MAP       --         Physical Exam    Nursing note and vitals reviewed.  Constitutional: She appears well-developed and well-nourished. She is not diaphoretic. No distress.   HENT:   Head: Normocephalic and atraumatic.   Eyes: Conjunctivae are normal. Pupils are equal, round, and reactive to light.   Neck: Neck supple.   Cardiovascular:  Regular rhythm, normal heart sounds and intact distal pulses.           tachycardia   Pulmonary/Chest: Breath sounds normal. No respiratory distress. She has no wheezes. She has no rales.   No breast engorgement   Abdominal: Abdomen is soft. She exhibits no distension.   Well healed  incision site, minimal ttp in llq, no rebound or guarding, no CVAT   Musculoskeletal:         General: No tenderness or edema.      Cervical back: Neck supple.     Neurological: She is alert and oriented to person, place, and time.   Skin: Skin is warm and dry.         ED Course   Procedures  Labs Reviewed   CBC W/ AUTO DIFFERENTIAL - Abnormal; Notable for the following components:       Result Value    Hemoglobin 9.6 (*)     Hematocrit 32.4 (*)     MCV 78 (*)     MCH 23.1 (*)     MCHC 29.6 (*)     RDW 20.8 (*)     MPV 8.9 (*)     All other components within normal limits   PROTIME-INR - Abnormal; Notable for the following components:    Prothrombin Time 14.1 (*)     INR 1.3 (*)     All other components within normal limits   URINALYSIS, REFLEX TO URINE CULTURE - Abnormal; Notable for the following components:    Appearance, UA Hazy (*)     Leukocytes, UA Trace (*)     All other components within normal limits    Narrative:     Specimen Source->Urine   URINALYSIS MICROSCOPIC - Abnormal; Notable for the following components:    WBC, UA 7 (*)     Bacteria Few (*)     All other components within normal limits    Narrative:     Specimen Source->Urine    PROTIME-INR - Abnormal; Notable for the following components:    Prothrombin Time 13.8 (*)     INR 1.3 (*)     All other components within normal limits   HIV 1 / 2 ANTIBODY    Narrative:     Release to patient->Immediate   HEPATITIS C ANTIBODY    Narrative:     Release to patient->Immediate   SARS-COV-2 RNA AMPLIFICATION, QUAL   COMPREHENSIVE METABOLIC PANEL   TSH   TROPONIN I   HEMOGLOBIN A1C   POCT URINE PREGNANCY     EKG Readings: (Independently Interpreted)   Sinus tachycardia, regular rhythm, no arely            Medications   sodium chloride 0.9% flush 10 mL (has no administration in time range)   naloxone 0.4 mg/mL injection 0.02 mg (has no administration in time range)   glucose chewable tablet 16 g (has no administration in time range)   glucose chewable tablet 24 g (has no administration in time range)   glucagon (human recombinant) injection 1 mg (has no administration in time range)   acetaminophen tablet 650 mg (650 mg Oral Given 9/3/23 0508)   melatonin tablet 6 mg (has no administration in time range)   dextrose 10% bolus 125 mL 125 mL (has no administration in time range)   dextrose 10% bolus 250 mL 250 mL (has no administration in time range)   apixaban tablet 10 mg (has no administration in time range)   iohexoL (OMNIPAQUE 350) injection 75 mL (75 mLs Intravenous Given 9/2/23 2329)   enoxaparin injection 80 mg (80 mg Subcutaneous Given 9/3/23 0028)   acetaminophen tablet 650 mg (650 mg Oral Given 9/3/23 0044)   HYDROcodone-acetaminophen 5-325 mg per tablet 1 tablet (1 tablet Oral Given 9/3/23 0044)     Medical Decision Making  23 year old healthy female post partum/operative course complicated by IVC thrombus presents with fever.  Mild llq pain which she has had but no other focal complaints. No vaginal discharge. No dysuria. Broad differential likely virus. Basic blood including INR, blood culture, UA and covid. Given recent surgery and llq abd discomfort as well as known IVC filter will order  CTabd to evaluate for post surgical complication/abscess as well as eval thrombus. Anticiapte discharge home barring finding ob CT abd.    Amount and/or Complexity of Data Reviewed  Independent Historian: parent     Details: Per mother carrier of factor V Leiden  External Data Reviewed: notes.     Details: Pt had recent  post operative course complicated by vena cava embolus/thrombus- seen by hematology and vascular at [facility]- per note evaluating for possible removal of clot  Radiology: ordered.  ECG/medicine tests: independent interpretation performed.  Discussion of management or test interpretation with external provider(s): Pt, significant other and mother informed of blood work..  Subtherapeutic INR.  Would likely transition to NOAC but at this time will give a one time dose of Lovenox- if discharged rx for lovenox and f/u pcp/hematology for repeat INR/discussion of transitin to NOAC    11:15 PM  Patient signed out to oncoming staff pending CT abdomen and final dispostion    Risk  OTC drugs.  Prescription drug management.  Decision regarding hospitalization.                               Clinical Impression:   Final diagnoses:  [R50.9] Fever, unspecified fever cause (Primary)           Felicitas Edwards MD  23 [id number]

## 2023-09-03 NOTE — DISCHARGE SUMMARY
[facility]  Hospital Medicine  Discharge Summary      Patient Name: Zulema Angulo  MRN: [id number]  JORDAN: [id number]  Patient Class: OP- Observation  Admission Date: 2023  Hospital Length of Stay: 0 days  Discharge Date and Time:  2023 12:34 PM  Attending Physician: Shira att. providers found   Discharging Provider: Mook Valadez DO  Primary Care Provider: Abram Merrill MD  [facility] Medicine Team: [facility] 3 Mook Valadez DO  Primary Care Team: [facility] 3    HPI:   Mrs Angulo is a 23 y.o. female presents who is a known case of IVC clot hat completed 7 days of Lovenox and was on warfarin (following up in hem/onc opd) who presented with worsening lower abdominal pain that radiates to the back in association with fever (100.0)  In the absence of cough, sputum, changes in urinary or bowel habits, or LOC nor seizures.  Patient denied SOB when questioned.  She was told by her treating team to present to ED in case of fever.  She describes her lower abdominal pain as constant and dull in  nature with a severity of 6/10 that is worsened upon movement and radiated to her lower leg.  Patient is able to ambulate.  Her most recent delivery was her last  that was birthed 6 weeks ago after which she got her tubes ligated.  For some reason her heme/onc team only supplied her Lovenox for 7 days and told her to continue on warfarin, however, her INR is subtherapeutic today.  She was scheduled  for an appointment with vascular surgery team  CT was done at the ED today which showed progression of the IVC clot. Vascular team has been consulted for the AM.    CT at the ED:  Extensive thrombus formation extending from the suprarenal IVC to the bilateral common iliac veins.  Interval progression of thrombus formation within the left external iliac and common femoral veins.  Trace endometrial and endocervical fluid and enhancement were present on 2023 but appear more prominent today.   Although physiologic or normal postsurgical changes are possible, careful clinical correlation is advised to help exclude endometritis or other infectious process.  Neoplasm less likely.  Correlate clinically.  Stranding in the intrapelvic fat is possibly also postoperative or related to small vessel congestion secondary to the iliocaval DVT.  However, PID could present with some similar findings.  Correlate clinically.  Trace free fluid within the pelvis.      * No surgery found *      Hospital Course:   23-year-old female with recent diagnosis of a vena cava thrombus following  in mid July presents with a fever. She was taking Lovenox for 7 days but has recently transitioned to Warfarin. She came to the ED with a low-grade fever per her instructions if she develops fever in the setting of acute DVT. During her stay, her Warfarin levels were found to be sub-therapeutic. Repeat CT today showed extensive thrombus formation extending from the suprarenal IVC to the bilateral common iliac veins.  Interval progression of thrombus formation within the left external iliac and common femoral veins. Vascular surgery was consulted and evaluated her. Vascular decided to switch her from Warfarin to Eliquis. She is currently scheduled to follow up with Vascular surgery outpatient in a week and future plans for thrombectomy.  Physical Exam     Nursing note and vitals reviewed.  Constitutional: She appears well-developed and well-nourished. She is not diaphoretic. No distress.   HENT:   Head: Normocephalic and atraumatic.   Eyes: Conjunctivae are normal. Pupils are equal, round, and reactive to light.   Neck: Neck supple.   Cardiovascular:  Regular rhythm, normal heart sounds and intact distal pulses.           tachycardia   Pulmonary/Chest: Breath sounds normal. No respiratory distress. She has no wheezes. She has no rales.   No breast engorgement   Abdominal: Abdomen is soft. She exhibits no distension.   Well healed   incision site, minimal ttp in llq, no rebound or guarding, no CVAT   Musculoskeletal:         General: No tenderness or edema.      Cervical back: Neck supple.      Neurological: She is alert and oriented to person, place, and time.   Skin: Skin is warm and dry.     Goals of Care Treatment Preferences:  Code Status: Full Code      Consults:   Consults (From admission, onward)          Status Ordering Provider     Inpatient consult to Vascular Surgery  Once        Provider:  (Not yet assigned)    Completed ARIC JONES            Cardiac/Vascular  Elevated blood pressure reading  - Holding home Labetalol.      Hematology  * Acute deep vein thrombosis (DVT) of non-extremity vein  23 y.o female with diagnosis of vena cava thrombus. Likely formed in the setting recent pregnancy/ and Factor V Leiden carrier status.    Plan:   - Start enoxaparin 0.8 sc bid   - Hold warfarin    - Follow up with Vascular surgery outpatient as scheduled.        IVC thrombosis   - Follow up with Vascular surgery.   - Tentative plans for thrombectomy.      Final Active Diagnoses:    Diagnosis Date Noted POA    PRINCIPAL PROBLEM:  Acute deep vein thrombosis (DVT) of non-extremity vein [I82.90] 2023 Yes    Elevated blood pressure reading [R03.0] 2023 Unknown    IVC thrombosis [I82.220] 2023 Yes    SIRS (systemic inflammatory response syndrome) [R65.10] 2023 Yes      Problems Resolved During this Admission:       Discharged Condition: stable    Disposition: Home or Self Care    Follow Up:   Follow-up Information       Schedule an appointment as soon as possible for a visit  with Abram Merrill MD.    Specialty: Obstetrics  Contact information:  [address] [place] [address] [address]  OB/GYN  [facility] [id number]  [phone]                           Patient Instructions:      Ambulatory referral/consult to Obstetrics / Gynecology   Standing Status: Future   Referral Priority: Routine Referral Type:  Consultation   Referral Reason: Specialty Services Required   Requested Specialty: Obstetrics and Gynecology   Number of Visits Requested: 1     Ambulatory referral/consult to Vascular Surgery   Standing Status: Future   Referral Priority: Routine Referral Type: Consultation   Referral Reason: Specialty Services Required   Requested Specialty: Vascular Surgery   Number of Visits Requested: 1       Significant Diagnostic Studies: N/A    Pending Diagnostic Studies:       None           Medications:  Reconciled Home Medications:      Medication List        START taking these medications      ELIQUIS DVT-PE TREAT 30D START 5 mg (74 tabs) Dspk  Generic drug: apixaban  For the first 7 days, take two 5 mg tablets twice daily.  After 7 days, take one 5 mg tablet twice daily.            STOP taking these medications      labetaloL 200 MG tablet  Commonly known as: NORMODYNE     warfarin 5 MG tablet  Commonly known as: COUMADIN              Indwelling Lines/Drains at time of discharge:   Lines/Drains/Airways       None                   Time spent on the discharge of patient: 35 minutes         Mook Valadez DO  Department of Hospital Medicine  [facility]

## 2023-09-03 NOTE — HPI
Zulema Angulo is a 23 year old female who is 6 weeks postpartum who presents to the emergency department with complaint of lower abdominal pain, migraines, and subjective fever (<100.4).     She initially presented to a hospital in [place] on 8/12 for migraines and back pain. 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 CTA PE was negative at that time. She was hospitalized and started on heparin which was transitioned to lovenox to bridge to coumadin (due to plan for breast feeding).     Since previous evaluation, the patient denies any leg swelling or difficulty breathing. A repeat CT scan was performed today which shows extension of thrombus into left external iliac and common femoral veins. Her INR has been subtherapeutic at outpatient follow up, her dose of coumadin was increased, INR remains subtherapeutic at 1.4 today. The patient is no longer breast feeding.     Denies numbness or weakness of lower extremities.

## 2023-09-03 NOTE — ASSESSMENT & PLAN NOTE
23 y.o female with diagnosis of vena cava thrombus. Likely formed in the setting recent pregnancy/ and Factor V Leiden carrier status.    Plan:   - Start enoxaparin 0.8 sc bid   - Hold warfarin    - Follow up with Vascular surgery outpatient as scheduled.

## 2023-09-05 ENCOUNTER — PATIENT MESSAGE (OUTPATIENT)
Dept: VASCULAR SURGERY | Facility: CLINIC | Age: 23
End: 2023-09-05
Payer: COMMERCIAL

## 2023-09-06 ENCOUNTER — OFFICE VISIT (OUTPATIENT)
Dept: VASCULAR SURGERY | Facility: CLINIC | Age: 23
End: 2023-09-06
Payer: COMMERCIAL

## 2023-09-06 DIAGNOSIS — I82.220 IVC THROMBOSIS: Primary | ICD-10-CM

## 2023-09-06 DIAGNOSIS — I82.90 ACUTE DEEP VEIN THROMBOSIS (DVT) OF NON-EXTREMITY VEIN: ICD-10-CM

## 2023-09-06 PROCEDURE — 99215 PR OFFICE/OUTPT VISIT, EST, LEVL V, 40-54 MIN: ICD-10-PCS | Mod: S$GLB,,, | Performed by: SURGERY

## 2023-09-06 PROCEDURE — 3044F HG A1C LEVEL LT 7.0%: CPT | Mod: CPTII,S$GLB,, | Performed by: SURGERY

## 2023-09-06 PROCEDURE — 3044F PR MOST RECENT HEMOGLOBIN A1C LEVEL <7.0%: ICD-10-PCS | Mod: CPTII,S$GLB,, | Performed by: SURGERY

## 2023-09-06 PROCEDURE — 99215 OFFICE O/P EST HI 40 MIN: CPT | Mod: S$GLB,,, | Performed by: SURGERY

## 2023-09-06 NOTE — PROGRESS NOTES
History of Present Illness: Zulema Angulo is a 23 year old female who is 6 weeks postpartum who presents to the emergency department with complaint of lower abdominal pain, migraines, and subjective fever (<100.4).      She initially presented to a hospital in [place] on 8/12 for migraines and back pain. 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 CTA PE was negative at that time. She was hospitalized and started on heparin which was transitioned to lovenox to bridge to coumadin (due to plan for breast feeding).      Since previous evaluation, the patient denies any leg swelling or difficulty breathing. A repeat CT scan was performed today which shows extension of thrombus into left external iliac and common femoral veins. Her INR has been subtherapeutic at outpatient follow up, her dose of coumadin was increased, INR remains subtherapeutic at 1.4 today. The patient is no longer breast feeding.      Denies numbness or weakness of lower extremities.      9/2023:  Presents with c/o LLE edema, pain and discoloration.  No weakness or sensory changes.  On Eliquis.  Recent acute DVT developed of L EIV and CFV subtherapeutic on Coumadin.  +Heterozygote Factor V Leiden.        Review of patient's allergies indicates:  No Known Allergies     No past medical history on file.  No past surgical history on file.  Family History    None              Tobacco Use    Smoking status: Not on file    Smokeless tobacco: Not on file   Substance and Sexual Activity    Alcohol use: Not on file    Drug use: Not on file    Sexual activity: Not on file      Review of Systems   Constitutional:   Negative for chills and fatigue.   Respiratory:  Negative for shortness of breath.    Cardiovascular:  Negative for chest pain.   Gastrointestinal:  No abdominal pain  Skin:  Negative for color  change and wound.   Neurological:  No Headache  All other systems reviewed and are negative.     Objective:      Vital Signs (Most Recent):  Temp: 98.1 °F (36.7 °C) (09/03/23 0800)  Pulse: 87 (09/03/23 0732)  Resp: 18 (09/03/23 0732)  BP: 112/68 (09/03/23 0732)  SpO2: 99 % (09/03/23 0732) Vital Signs (24h Range):  Temp:  [98.1 °F (36.7 °C)-99.6 °F (37.6 °C)] 98.1 °F (36.7 °C)  Pulse:  [] 87  Resp:  [13-20] 18  SpO2:  [94 %-100 %] 99 %  BP: (109-130)/(60-84) 112/68      Weight: 72.6 kg (160 lb)  Body mass index is 33.44 kg/m².      Physical Exam  Constitutional:       Appearance: Normal appearance.   HENT:      Head: Normocephalic and atraumatic.   Cardiovascular:      Rate and Rhythm: Normal rate and regular rhythm. 2+ PT bilaterally.    Pulmonary:      Effort: Pulmonary effort is normal.   Abdominal:      General: Abdomen is flat.      Tenderness: There is no abdominal tenderness.   Musculoskeletal:         General: No swelling.      Left lower leg: No edema.   Skin:     General: Skin is warm. Discoloration to LLE. 2+ LLE edema.     Capillary Refill: Capillary refill takes less than 2 seconds.   Neurological:      General: No focal deficit present.      Mental Status: She is alert and oriented to person, place, and time.            Significant Labs:  Recent Lab Results  (Last 5 results in the past 24 hours)          09/03/23  0841   09/03/23  0504   09/02/23  1950   09/02/23  1945   09/02/23  1942         Appearance, UA             Hazy              Bacteria, UA             Few              Bilirubin (UA)             Negative              Color, UA             Yellow              Estimated Avg Glucose     85                      Glucose, UA             Negative              Hemoglobin A1C External     4.6  Comment: ADA Screening Guidelines:  5.7-6.4%  Consistent with prediabetes  >or=6.5%  Consistent with diabetes     High levels of fetal hemoglobin interfere with the HbA1C  assay. Heterozygous hemoglobin  variants (HbS, HgC, etc)do  not significantly interfere with this assay.   However, presence of multiple variants may affect accuracy.                         INR 1.3  Comment: Coumadin Therapy:  2.0 - 3.0 for INR for all indicators except mechanical heart valves  and antiphospholipid syndromes which should use 2.5 - 3.5.                             Ketones, UA             Negative              Leukocytes, UA             Trace              Microscopic Comment             SEE COMMENT  Comment: Other formed elements not mentioned in the report are not   present in the microscopic examination.                  NITRITE UA             Negative              Occult Blood UA             Negative              pH, UA             6.0              Preg Test, Ur         Negative                  Protein, UA             Negative  Comment: Recommend a 24 hour urine protein or a urine   protein/creatinine ratio if globulin induced proteinuria is  clinically suspected.                 OMG 13.8                           Acceptable         Yes                  RBC, UA             1              SARS-CoV-2 RNA, Amplification, Qual                 Negative  Comment: This test utilizes isothermal nucleic acid amplification technology   to   detect the SARS-CoV-2 RdRp nucleic acid segment. The analytical   sensitivity   (limit of detection) is 500 copies/swab.      A POSITIVE result is indicative of the presence of SARS-CoV-2 RNA;   clinical   correlation with patient history and other diagnostic information is   necessary to determine patient infection status.     A NEGATIVE result means that SARS-CoV-2 nucleic acids are not present   above   the limit of detection. A NEGATIVE result should be treated as   presumptive.   It does not rule out the possibility of COVID-19 and should not be   the sole   basis for treatment decisions. If COVID-19 is strongly suspected   based on   clinical and exposure history, re-testing  using an alternate   molecular assay   should be considered.      This test is only for use under the Food and Drug Administration s   Emergency   Use Authorization (EUA).      Commercial kits are provided by [employer]. Performance   characteristics of the EUA have been independently verified by   [facility] Department of Pathology and Laboratory Medicine.   _________________________________________________________________   The authorized Fact Sheet for Healthcare Providers and the authorized   Fact   Sheet for Patients of the ID NOW COVID-19 are available on the FDA   website:   https://www.fda.gov/media/086978/download   https://www.fda.gov/media/597691/download             Specific Gravity, UA             1.020              Specimen UA             Urine, Clean Catch              Squam Epithel, UA             14              Troponin I     <0.006  Comment: The reference interval for Troponin I represents the 99th percentile   cutoff   for our facility and is consistent with 3rd generation assay   performance.                         TSH     1.530                      WBC, UA             7                                                     Significant Diagnostics:  I have reviewed all pertinent imaging results/findings.     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  At this point, the thrombus is subacute, greater than 2 weeks since initial diagnosis.  Development of acute thrombus from previous evaluation as a result of subtherapeutic range of INR while on Coumadin.      -rec BLE venous thrombectomy, prone, popliteal vein access, IVUS with possible venoplasty and stenting 9/12/23 in OR 11    I spent 11 minutes evaluating this patient and greater than 50% of the time was spent counseling, coordinator care and discussing the plan of  care.  All questions were answered and patient stated understanding with agreement with the above treatment plan.    Rodrigo Pate M.D., R.P.VJOSEFA [facility]

## 2023-09-06 NOTE — H&P (VIEW-ONLY)
History of Present Illness: Zulema Angulo is a 23 year old female who is 6 weeks postpartum who presents to the emergency department with complaint of lower abdominal pain, migraines, and subjective fever (<100.4).      She initially presented to a hospital in [place] on 8/12 for migraines and back pain. 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 CTA PE was negative at that time. She was hospitalized and started on heparin which was transitioned to lovenox to bridge to coumadin (due to plan for breast feeding).      Since previous evaluation, the patient denies any leg swelling or difficulty breathing. A repeat CT scan was performed today which shows extension of thrombus into left external iliac and common femoral veins. Her INR has been subtherapeutic at outpatient follow up, her dose of coumadin was increased, INR remains subtherapeutic at 1.4 today. The patient is no longer breast feeding.      Denies numbness or weakness of lower extremities.      9/2023:  Presents with c/o LLE edema, pain and discoloration.  No weakness or sensory changes.  On Eliquis.  Recent acute DVT developed of L EIV and CFV subtherapeutic on Coumadin.  +Heterozygote Factor V Leiden.        Review of patient's allergies indicates:  No Known Allergies     No past medical history on file.  No past surgical history on file.  Family History    None              Tobacco Use    Smoking status: Not on file    Smokeless tobacco: Not on file   Substance and Sexual Activity    Alcohol use: Not on file    Drug use: Not on file    Sexual activity: Not on file      Review of Systems   Constitutional:   Negative for chills and fatigue.   Respiratory:  Negative for shortness of breath.    Cardiovascular:  Negative for chest pain.   Gastrointestinal:  No abdominal pain  Skin:  Negative for color  change and wound.   Neurological:  No Headache  All other systems reviewed and are negative.     Objective:      Vital Signs (Most Recent):  Temp: 98.1 °F (36.7 °C) (09/03/23 0800)  Pulse: 87 (09/03/23 0732)  Resp: 18 (09/03/23 0732)  BP: 112/68 (09/03/23 0732)  SpO2: 99 % (09/03/23 0732) Vital Signs (24h Range):  Temp:  [98.1 °F (36.7 °C)-99.6 °F (37.6 °C)] 98.1 °F (36.7 °C)  Pulse:  [] 87  Resp:  [13-20] 18  SpO2:  [94 %-100 %] 99 %  BP: (109-130)/(60-84) 112/68      Weight: 72.6 kg (160 lb)  Body mass index is 33.44 kg/m².      Physical Exam  Constitutional:       Appearance: Normal appearance.   HENT:      Head: Normocephalic and atraumatic.   Cardiovascular:      Rate and Rhythm: Normal rate and regular rhythm. 2+ PT bilaterally.    Pulmonary:      Effort: Pulmonary effort is normal.   Abdominal:      General: Abdomen is flat.      Tenderness: There is no abdominal tenderness.   Musculoskeletal:         General: No swelling.      Left lower leg: No edema.   Skin:     General: Skin is warm. Discoloration to LLE. 2+ LLE edema.     Capillary Refill: Capillary refill takes less than 2 seconds.   Neurological:      General: No focal deficit present.      Mental Status: She is alert and oriented to person, place, and time.            Significant Labs:  Recent Lab Results  (Last 5 results in the past 24 hours)          09/03/23  0841   09/03/23  0504   09/02/23  1950   09/02/23  1945   09/02/23  1942         Appearance, UA             Hazy              Bacteria, UA             Few              Bilirubin (UA)             Negative              Color, UA             Yellow              Estimated Avg Glucose     85                      Glucose, UA             Negative              Hemoglobin A1C External     4.6  Comment: ADA Screening Guidelines:  5.7-6.4%  Consistent with prediabetes  >or=6.5%  Consistent with diabetes     High levels of fetal hemoglobin interfere with the HbA1C  assay. Heterozygous hemoglobin  variants (HbS, HgC, etc)do  not significantly interfere with this assay.   However, presence of multiple variants may affect accuracy.                         INR 1.3  Comment: Coumadin Therapy:  2.0 - 3.0 for INR for all indicators except mechanical heart valves  and antiphospholipid syndromes which should use 2.5 - 3.5.                             Ketones, UA             Negative              Leukocytes, UA             Trace              Microscopic Comment             SEE COMMENT  Comment: Other formed elements not mentioned in the report are not   present in the microscopic examination.                  NITRITE UA             Negative              Occult Blood UA             Negative              pH, UA             6.0              Preg Test, Ur         Negative                  Protein, UA             Negative  Comment: Recommend a 24 hour urine protein or a urine   protein/creatinine ratio if globulin induced proteinuria is  clinically suspected.                 i'mma 13.8                           Acceptable         Yes                  RBC, UA             1              SARS-CoV-2 RNA, Amplification, Qual                 Negative  Comment: This test utilizes isothermal nucleic acid amplification technology   to   detect the SARS-CoV-2 RdRp nucleic acid segment. The analytical   sensitivity   (limit of detection) is 500 copies/swab.      A POSITIVE result is indicative of the presence of SARS-CoV-2 RNA;   clinical   correlation with patient history and other diagnostic information is   necessary to determine patient infection status.     A NEGATIVE result means that SARS-CoV-2 nucleic acids are not present   above   the limit of detection. A NEGATIVE result should be treated as   presumptive.   It does not rule out the possibility of COVID-19 and should not be   the sole   basis for treatment decisions. If COVID-19 is strongly suspected   based on   clinical and exposure history, re-testing  using an alternate   molecular assay   should be considered.      This test is only for use under the Food and Drug Administration s   Emergency   Use Authorization (EUA).      Commercial kits are provided by [employer]. Performance   characteristics of the EUA have been independently verified by   [facility] Department of Pathology and Laboratory Medicine.   _________________________________________________________________   The authorized Fact Sheet for Healthcare Providers and the authorized   Fact   Sheet for Patients of the ID NOW COVID-19 are available on the FDA   website:   https://www.fda.gov/media/792948/download   https://www.fda.gov/media/726011/download             Specific Gravity, UA             1.020              Specimen UA             Urine, Clean Catch              Squam Epithel, UA             14              Troponin I     <0.006  Comment: The reference interval for Troponin I represents the 99th percentile   cutoff   for our facility and is consistent with 3rd generation assay   performance.                         TSH     1.530                      WBC, UA             7                                                     Significant Diagnostics:  I have reviewed all pertinent imaging results/findings.     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  At this point, the thrombus is subacute, greater than 2 weeks since initial diagnosis.  Development of acute thrombus from previous evaluation as a result of subtherapeutic range of INR while on Coumadin.      -rec BLE venous thrombectomy, prone, popliteal vein access, IVUS with possible venoplasty and stenting 9/12/23 in OR 11    I spent 11 minutes evaluating this patient and greater than 50% of the time was spent counseling, coordinator care and discussing the plan of  care.  All questions were answered and patient stated understanding with agreement with the above treatment plan.    Rodrigo Pate M.D., R.P.VJOSEFA [facility]

## 2023-09-11 ENCOUNTER — PATIENT MESSAGE (OUTPATIENT)
Dept: SURGERY | Facility: HOSPITAL | Age: 23
End: 2023-09-11
Payer: COMMERCIAL

## 2023-09-11 NOTE — PRE-PROCEDURE INSTRUCTIONS
PreOp Instructions given:   - Verbal medication information (what to hold and what to take)   - NPO guidelines   - Arrival place directions given; time to be given the day before procedure by the   Surgeon's Office 1100 DOSC  - Bathing with antibacterial soap   - Don't wear any jewelry or bring any valuables AM of surgery   - No makeup or moisturizer to face   - No perfume/cologne, powder, lotions or aftershave   Pt. verbalized understanding.   Pt denies any h/o Anesthesia/Sedation complications or side effects.

## 2023-09-12 ENCOUNTER — ANESTHESIA (OUTPATIENT)
Dept: SURGERY | Facility: HOSPITAL | Age: 23
DRG: 271 | End: 2023-09-12
Payer: COMMERCIAL

## 2023-09-12 ENCOUNTER — HOSPITAL ENCOUNTER (INPATIENT)
Facility: HOSPITAL | Age: 23
LOS: 1 days | Discharge: HOME | DRG: 271 | End: 2023-09-13
Attending: SURGERY | Admitting: SURGERY
Payer: COMMERCIAL

## 2023-09-12 ENCOUNTER — ANESTHESIA EVENT (OUTPATIENT)
Dept: SURGERY | Facility: HOSPITAL | Age: 23
DRG: 271 | End: 2023-09-12
Payer: COMMERCIAL

## 2023-09-12 DIAGNOSIS — I82.90 ACUTE DEEP VEIN THROMBOSIS (DVT) OF NON-EXTREMITY VEIN: Primary | ICD-10-CM

## 2023-09-12 DIAGNOSIS — I82.409 DVT (DEEP VENOUS THROMBOSIS): ICD-10-CM

## 2023-09-12 LAB
APTT PPP: 29.5 SEC (ref 21–32)
APTT PPP: >150 SEC (ref 21–32)
B-HCG UR QL: NEGATIVE
BASOPHILS # BLD AUTO: 0.02 K/UL (ref 0–0.2)
BASOPHILS NFR BLD: 0.2 % (ref 0–1.9)
CTP QC/QA: YES
DIFFERENTIAL METHOD: ABNORMAL
EOSINOPHIL # BLD AUTO: 0 K/UL (ref 0–0.5)
EOSINOPHIL NFR BLD: 0.2 % (ref 0–8)
ERYTHROCYTE [DISTWIDTH] IN BLOOD BY AUTOMATED COUNT: 19.8 % (ref 11.5–14.5)
HCT VFR BLD AUTO: 30.7 % (ref 37–48.5)
HGB BLD-MCNC: 9.2 G/DL (ref 12–16)
IMM GRANULOCYTES # BLD AUTO: 0.05 K/UL (ref 0–0.04)
IMM GRANULOCYTES NFR BLD AUTO: 0.5 % (ref 0–0.5)
INR PPP: 1.3 (ref 0.8–1.2)
LYMPHOCYTES # BLD AUTO: 1.6 K/UL (ref 1–4.8)
LYMPHOCYTES NFR BLD: 15.2 % (ref 18–48)
MCH RBC QN AUTO: 23.7 PG (ref 27–31)
MCHC RBC AUTO-ENTMCNC: 30 G/DL (ref 32–36)
MCV RBC AUTO: 79 FL (ref 82–98)
MONOCYTES # BLD AUTO: 0.1 K/UL (ref 0.3–1)
MONOCYTES NFR BLD: 1.2 % (ref 4–15)
NEUTROPHILS # BLD AUTO: 8.9 K/UL (ref 1.8–7.7)
NEUTROPHILS NFR BLD: 82.7 % (ref 38–73)
NRBC BLD-RTO: 0 /100 WBC
PLATELET # BLD AUTO: 295 K/UL (ref 150–450)
PMV BLD AUTO: 9 FL (ref 9.2–12.9)
POC ACTIVATED CLOTTING TIME K: 251 SEC (ref 74–137)
POC ACTIVATED CLOTTING TIME K: 287 SEC (ref 74–137)
PROTHROMBIN TIME: 13.7 SEC (ref 9–12.5)
RBC # BLD AUTO: 3.88 M/UL (ref 4–5.4)
SAMPLE: ABNORMAL
SAMPLE: ABNORMAL
WBC # BLD AUTO: 10.73 K/UL (ref 3.9–12.7)

## 2023-09-12 PROCEDURE — D9220A PRA ANESTHESIA: Mod: ANES,,, | Performed by: ANESTHESIOLOGY

## 2023-09-12 PROCEDURE — 85730 THROMBOPLASTIN TIME PARTIAL: CPT | Mod: 91 | Performed by: SURGERY

## 2023-09-12 PROCEDURE — 25500020 PHARM REV CODE 255: Performed by: SURGERY

## 2023-09-12 PROCEDURE — D9220A PRA ANESTHESIA: Mod: CRNA,,, | Performed by: NURSE ANESTHETIST, CERTIFIED REGISTERED

## 2023-09-12 PROCEDURE — 36415 COLL VENOUS BLD VENIPUNCTURE: CPT | Performed by: STUDENT IN AN ORGANIZED HEALTH CARE EDUCATION/TRAINING PROGRAM

## 2023-09-12 PROCEDURE — C1753 CATH, INTRAVAS ULTRASOUND: HCPCS | Performed by: SURGERY

## 2023-09-12 PROCEDURE — 25000003 PHARM REV CODE 250

## 2023-09-12 PROCEDURE — 99499 UNLISTED E&M SERVICE: CPT | Mod: ,,, | Performed by: SURGERY

## 2023-09-12 PROCEDURE — 71000015 HC POSTOP RECOV 1ST HR: Performed by: SURGERY

## 2023-09-12 PROCEDURE — 81025 URINE PREGNANCY TEST: CPT | Performed by: SURGERY

## 2023-09-12 PROCEDURE — 63600175 PHARM REV CODE 636 W HCPCS: Performed by: SURGERY

## 2023-09-12 PROCEDURE — D9220A PRA ANESTHESIA: ICD-10-PCS | Mod: CRNA,,, | Performed by: NURSE ANESTHETIST, CERTIFIED REGISTERED

## 2023-09-12 PROCEDURE — C1894 INTRO/SHEATH, NON-LASER: HCPCS | Performed by: SURGERY

## 2023-09-12 PROCEDURE — 27201423 OPTIME MED/SURG SUP & DEVICES STERILE SUPPLY: Performed by: SURGERY

## 2023-09-12 PROCEDURE — 37000008 HC ANESTHESIA 1ST 15 MINUTES: Performed by: SURGERY

## 2023-09-12 PROCEDURE — 25000003 PHARM REV CODE 250: Performed by: NURSE ANESTHETIST, CERTIFIED REGISTERED

## 2023-09-12 PROCEDURE — 37238 OPEN/PERQ PLACE STENT SAME: CPT | Mod: 51,LT,, | Performed by: SURGERY

## 2023-09-12 PROCEDURE — C1887 CATHETER, GUIDING: HCPCS | Performed by: SURGERY

## 2023-09-12 PROCEDURE — 37000009 HC ANESTHESIA EA ADD 15 MINS: Performed by: SURGERY

## 2023-09-12 PROCEDURE — 63600175 PHARM REV CODE 636 W HCPCS: Performed by: NURSE ANESTHETIST, CERTIFIED REGISTERED

## 2023-09-12 PROCEDURE — 37238 PR OPEN/PERQ TRANSCATH PLACE STENT SAME VESSEL; INITIAL VEIN: ICD-10-PCS | Mod: 51,LT,, | Performed by: SURGERY

## 2023-09-12 PROCEDURE — C1769 GUIDE WIRE: HCPCS | Performed by: SURGERY

## 2023-09-12 PROCEDURE — 36000706: Performed by: SURGERY

## 2023-09-12 PROCEDURE — 36000707: Performed by: SURGERY

## 2023-09-12 PROCEDURE — 63600175 PHARM REV CODE 636 W HCPCS

## 2023-09-12 PROCEDURE — 99499 NO LOS: ICD-10-PCS | Mod: ,,, | Performed by: SURGERY

## 2023-09-12 PROCEDURE — D9220A PRA ANESTHESIA: ICD-10-PCS | Mod: ANES,,, | Performed by: ANESTHESIOLOGY

## 2023-09-12 PROCEDURE — 71000033 HC RECOVERY, INTIAL HOUR: Performed by: SURGERY

## 2023-09-12 PROCEDURE — 20600001 HC STEP DOWN PRIVATE ROOM

## 2023-09-12 PROCEDURE — 25000003 PHARM REV CODE 250: Performed by: STUDENT IN AN ORGANIZED HEALTH CARE EDUCATION/TRAINING PROGRAM

## 2023-09-12 PROCEDURE — 85730 THROMBOPLASTIN TIME PARTIAL: CPT | Performed by: STUDENT IN AN ORGANIZED HEALTH CARE EDUCATION/TRAINING PROGRAM

## 2023-09-12 PROCEDURE — 63600175 PHARM REV CODE 636 W HCPCS: Performed by: ANESTHESIOLOGY

## 2023-09-12 PROCEDURE — 85025 COMPLETE CBC W/AUTO DIFF WBC: CPT | Performed by: STUDENT IN AN ORGANIZED HEALTH CARE EDUCATION/TRAINING PROGRAM

## 2023-09-12 PROCEDURE — C2628 CATHETER, OCCLUSION: HCPCS | Performed by: SURGERY

## 2023-09-12 PROCEDURE — 37239 OPEN/PERQ PLACE STENT EA ADD: CPT | Mod: ,,, | Performed by: SURGERY

## 2023-09-12 PROCEDURE — 37239 PR OPEN/PERQ TRANSCATH PLACE STENT SAME VESSEL; EA ADD'L VEIN: ICD-10-PCS | Mod: ,,, | Performed by: SURGERY

## 2023-09-12 PROCEDURE — 36010 PR PLACE CATH IN VEIN,SVC,IVC: ICD-10-PCS | Mod: 51,,, | Performed by: SURGERY

## 2023-09-12 PROCEDURE — 37187 PR THROMBECTOMY, VENOUS: ICD-10-PCS | Mod: ,,, | Performed by: SURGERY

## 2023-09-12 PROCEDURE — 63600175 PHARM REV CODE 636 W HCPCS: Performed by: STUDENT IN AN ORGANIZED HEALTH CARE EDUCATION/TRAINING PROGRAM

## 2023-09-12 PROCEDURE — 37187 VENOUS MECH THROMBECTOMY: CPT | Mod: ,,, | Performed by: SURGERY

## 2023-09-12 PROCEDURE — 94761 N-INVAS EAR/PLS OXIMETRY MLT: CPT

## 2023-09-12 PROCEDURE — 36010 PLACE CATHETER IN VEIN: CPT | Mod: 51,,, | Performed by: SURGERY

## 2023-09-12 PROCEDURE — C1757 CATH, THROMBECTOMY/EMBOLECT: HCPCS | Performed by: SURGERY

## 2023-09-12 PROCEDURE — 36415 COLL VENOUS BLD VENIPUNCTURE: CPT | Performed by: SURGERY

## 2023-09-12 PROCEDURE — C1725 CATH, TRANSLUMIN NON-LASER: HCPCS | Performed by: SURGERY

## 2023-09-12 PROCEDURE — 85610 PROTHROMBIN TIME: CPT | Performed by: STUDENT IN AN ORGANIZED HEALTH CARE EDUCATION/TRAINING PROGRAM

## 2023-09-12 PROCEDURE — C1876 STENT, NON-COA/NON-COV W/DEL: HCPCS | Performed by: SURGERY

## 2023-09-12 DEVICE — VENOVO® VENOUS STENT SYSTEM 18 MM X 120 MM (80 CM DELIVERY CATHETER)
Type: IMPLANTABLE DEVICE | Site: VEIN | Status: FUNCTIONAL
Brand: VENOVO®

## 2023-09-12 DEVICE — IMPLANTABLE DEVICE: Type: IMPLANTABLE DEVICE | Site: VEIN | Status: FUNCTIONAL

## 2023-09-12 RX ORDER — SODIUM CHLORIDE 0.9 % (FLUSH) 0.9 %
10 SYRINGE (ML) INJECTION
Status: DISCONTINUED | OUTPATIENT
Start: 2023-09-12 | End: 2023-09-13 | Stop reason: HOSPADM

## 2023-09-12 RX ORDER — SODIUM CHLORIDE 0.9 % (FLUSH) 0.9 %
10 SYRINGE (ML) INJECTION
Status: DISCONTINUED | OUTPATIENT
Start: 2023-09-12 | End: 2023-09-12 | Stop reason: HOSPADM

## 2023-09-12 RX ORDER — HYDROMORPHONE HYDROCHLORIDE 1 MG/ML
0.5 INJECTION, SOLUTION INTRAMUSCULAR; INTRAVENOUS; SUBCUTANEOUS ONCE
Status: COMPLETED | OUTPATIENT
Start: 2023-09-12 | End: 2023-09-12

## 2023-09-12 RX ORDER — HEPARIN SODIUM 1000 [USP'U]/ML
INJECTION, SOLUTION INTRAVENOUS; SUBCUTANEOUS
Status: DISCONTINUED | OUTPATIENT
Start: 2023-09-12 | End: 2023-09-12

## 2023-09-12 RX ORDER — DEXAMETHASONE SODIUM PHOSPHATE 4 MG/ML
INJECTION, SOLUTION INTRA-ARTICULAR; INTRALESIONAL; INTRAMUSCULAR; INTRAVENOUS; SOFT TISSUE
Status: DISCONTINUED | OUTPATIENT
Start: 2023-09-12 | End: 2023-09-12

## 2023-09-12 RX ORDER — HALOPERIDOL 5 MG/ML
0.5 INJECTION INTRAMUSCULAR EVERY 10 MIN PRN
Status: DISCONTINUED | OUTPATIENT
Start: 2023-09-12 | End: 2023-09-12 | Stop reason: HOSPADM

## 2023-09-12 RX ORDER — DEXMEDETOMIDINE HYDROCHLORIDE 100 UG/ML
INJECTION, SOLUTION INTRAVENOUS
Status: DISCONTINUED | OUTPATIENT
Start: 2023-09-12 | End: 2023-09-12

## 2023-09-12 RX ORDER — ONDANSETRON 8 MG/1
8 TABLET, ORALLY DISINTEGRATING ORAL EVERY 8 HOURS PRN
Status: DISPENSED | OUTPATIENT
Start: 2023-09-12

## 2023-09-12 RX ORDER — CEFAZOLIN SODIUM 1 G/3ML
INJECTION, POWDER, FOR SOLUTION INTRAMUSCULAR; INTRAVENOUS
Status: DISCONTINUED | OUTPATIENT
Start: 2023-09-12 | End: 2023-09-12

## 2023-09-12 RX ORDER — GABAPENTIN 300 MG/1
300 CAPSULE ORAL 3 TIMES DAILY
Status: DISCONTINUED | OUTPATIENT
Start: 2023-09-12 | End: 2023-09-13 | Stop reason: HOSPADM

## 2023-09-12 RX ORDER — HEPARIN SODIUM,PORCINE/D5W 25000/250
0-40 INTRAVENOUS SOLUTION INTRAVENOUS CONTINUOUS
Status: DISCONTINUED | OUTPATIENT
Start: 2023-09-12 | End: 2023-09-13 | Stop reason: HOSPADM

## 2023-09-12 RX ORDER — METHOCARBAMOL 500 MG/1
500 TABLET, FILM COATED ORAL 4 TIMES DAILY
Status: DISCONTINUED | OUTPATIENT
Start: 2023-09-12 | End: 2023-09-13 | Stop reason: HOSPADM

## 2023-09-12 RX ORDER — MIDAZOLAM HYDROCHLORIDE 1 MG/ML
INJECTION, SOLUTION INTRAMUSCULAR; INTRAVENOUS
Status: DISCONTINUED | OUTPATIENT
Start: 2023-09-12 | End: 2023-09-12

## 2023-09-12 RX ORDER — FENTANYL CITRATE 50 UG/ML
INJECTION, SOLUTION INTRAMUSCULAR; INTRAVENOUS
Status: DISCONTINUED | OUTPATIENT
Start: 2023-09-12 | End: 2023-09-12

## 2023-09-12 RX ORDER — ACETAMINOPHEN 10 MG/ML
INJECTION, SOLUTION INTRAVENOUS
Status: DISCONTINUED | OUTPATIENT
Start: 2023-09-12 | End: 2023-09-12

## 2023-09-12 RX ORDER — ACETAMINOPHEN 500 MG
1000 TABLET ORAL EVERY 8 HOURS
Status: DISCONTINUED | OUTPATIENT
Start: 2023-09-12 | End: 2023-09-13 | Stop reason: HOSPADM

## 2023-09-12 RX ORDER — HYDROMORPHONE HYDROCHLORIDE 1 MG/ML
INJECTION, SOLUTION INTRAMUSCULAR; INTRAVENOUS; SUBCUTANEOUS
Status: DISPENSED
Start: 2023-09-12 | End: 2023-09-13

## 2023-09-12 RX ORDER — ROCURONIUM BROMIDE 10 MG/ML
INJECTION, SOLUTION INTRAVENOUS
Status: DISCONTINUED | OUTPATIENT
Start: 2023-09-12 | End: 2023-09-12

## 2023-09-12 RX ORDER — METOCLOPRAMIDE HYDROCHLORIDE 5 MG/ML
10 INJECTION INTRAMUSCULAR; INTRAVENOUS EVERY 10 MIN PRN
Status: DISCONTINUED | OUTPATIENT
Start: 2023-09-12 | End: 2023-09-12 | Stop reason: HOSPADM

## 2023-09-12 RX ORDER — HYDROMORPHONE HYDROCHLORIDE 1 MG/ML
0.2 INJECTION, SOLUTION INTRAMUSCULAR; INTRAVENOUS; SUBCUTANEOUS EVERY 5 MIN PRN
Status: DISCONTINUED | OUTPATIENT
Start: 2023-09-12 | End: 2023-09-12 | Stop reason: HOSPADM

## 2023-09-12 RX ORDER — PROPOFOL 10 MG/ML
VIAL (ML) INTRAVENOUS
Status: DISCONTINUED | OUTPATIENT
Start: 2023-09-12 | End: 2023-09-12

## 2023-09-12 RX ORDER — OXYCODONE HYDROCHLORIDE 5 MG/1
5 TABLET ORAL EVERY 6 HOURS PRN
Status: DISCONTINUED | OUTPATIENT
Start: 2023-09-12 | End: 2023-09-13 | Stop reason: HOSPADM

## 2023-09-12 RX ORDER — LIDOCAINE HYDROCHLORIDE 20 MG/ML
INJECTION INTRAVENOUS
Status: DISCONTINUED | OUTPATIENT
Start: 2023-09-12 | End: 2023-09-12

## 2023-09-12 RX ORDER — HEPARIN SODIUM 1000 [USP'U]/ML
INJECTION, SOLUTION INTRAVENOUS; SUBCUTANEOUS
Status: DISCONTINUED | OUTPATIENT
Start: 2023-09-12 | End: 2023-09-12 | Stop reason: HOSPADM

## 2023-09-12 RX ORDER — IBUPROFEN 400 MG/1
400 TABLET ORAL EVERY 6 HOURS PRN
Status: DISCONTINUED | OUTPATIENT
Start: 2023-09-12 | End: 2023-09-13 | Stop reason: HOSPADM

## 2023-09-12 RX ORDER — IODIXANOL 320 MG/ML
INJECTION, SOLUTION INTRAVASCULAR
Status: DISCONTINUED | OUTPATIENT
Start: 2023-09-12 | End: 2023-09-12 | Stop reason: HOSPADM

## 2023-09-12 RX ORDER — ONDANSETRON 2 MG/ML
INJECTION INTRAMUSCULAR; INTRAVENOUS
Status: DISCONTINUED | OUTPATIENT
Start: 2023-09-12 | End: 2023-09-12

## 2023-09-12 RX ORDER — SODIUM CHLORIDE 9 MG/ML
INJECTION, SOLUTION INTRAVENOUS CONTINUOUS
Status: DISCONTINUED | OUTPATIENT
Start: 2023-09-12 | End: 2023-09-12

## 2023-09-12 RX ORDER — ACETAMINOPHEN 500 MG
1000 TABLET ORAL EVERY 8 HOURS PRN
Status: DISCONTINUED | OUTPATIENT
Start: 2023-09-12 | End: 2023-09-12

## 2023-09-12 RX ADMIN — HEPARIN SODIUM AND DEXTROSE 12 UNITS/KG/HR: 10000; 5 INJECTION INTRAVENOUS at 10:09

## 2023-09-12 RX ADMIN — HYDROMORPHONE HYDROCHLORIDE 0.5 MG: 1 INJECTION, SOLUTION INTRAMUSCULAR; INTRAVENOUS; SUBCUTANEOUS at 06:09

## 2023-09-12 RX ADMIN — ONDANSETRON 8 MG: 8 TABLET, ORALLY DISINTEGRATING ORAL at 08:09

## 2023-09-12 RX ADMIN — DEXMEDETOMIDINE 8 MCG: 100 INJECTION, SOLUTION, CONCENTRATE INTRAVENOUS at 03:09

## 2023-09-12 RX ADMIN — ACETAMINOPHEN 1000 MG: 500 TABLET ORAL at 09:09

## 2023-09-12 RX ADMIN — HYDROMORPHONE HYDROCHLORIDE 0.2 MG: 1 INJECTION, SOLUTION INTRAMUSCULAR; INTRAVENOUS; SUBCUTANEOUS at 04:09

## 2023-09-12 RX ADMIN — FENTANYL CITRATE 25 MCG: 50 INJECTION, SOLUTION INTRAMUSCULAR; INTRAVENOUS at 02:09

## 2023-09-12 RX ADMIN — HEPARIN SODIUM 1000 UNITS: 1000 INJECTION, SOLUTION INTRAVENOUS; SUBCUTANEOUS at 01:09

## 2023-09-12 RX ADMIN — ONDANSETRON 4 MG: 2 INJECTION INTRAMUSCULAR; INTRAVENOUS at 03:09

## 2023-09-12 RX ADMIN — OXYCODONE HYDROCHLORIDE 5 MG: 5 TABLET ORAL at 05:09

## 2023-09-12 RX ADMIN — SODIUM CHLORIDE: 9 INJECTION, SOLUTION INTRAVENOUS at 11:09

## 2023-09-12 RX ADMIN — HEPARIN SODIUM 7000 UNITS: 1000 INJECTION, SOLUTION INTRAVENOUS; SUBCUTANEOUS at 12:09

## 2023-09-12 RX ADMIN — SUGAMMADEX 200 MG: 100 INJECTION, SOLUTION INTRAVENOUS at 03:09

## 2023-09-12 RX ADMIN — ROCURONIUM BROMIDE 50 MG: 10 INJECTION INTRAVENOUS at 11:09

## 2023-09-12 RX ADMIN — PROPOFOL 160 MG: 10 INJECTION, EMULSION INTRAVENOUS at 11:09

## 2023-09-12 RX ADMIN — GABAPENTIN 300 MG: 300 CAPSULE ORAL at 08:09

## 2023-09-12 RX ADMIN — FENTANYL CITRATE 100 MCG: 50 INJECTION, SOLUTION INTRAMUSCULAR; INTRAVENOUS at 11:09

## 2023-09-12 RX ADMIN — FENTANYL CITRATE 50 MCG: 50 INJECTION, SOLUTION INTRAMUSCULAR; INTRAVENOUS at 12:09

## 2023-09-12 RX ADMIN — ROCURONIUM BROMIDE 20 MG: 10 INJECTION INTRAVENOUS at 02:09

## 2023-09-12 RX ADMIN — IBUPROFEN 400 MG: 400 TABLET ORAL at 08:09

## 2023-09-12 RX ADMIN — LIDOCAINE HYDROCHLORIDE 100 MG: 20 INJECTION INTRAVENOUS at 11:09

## 2023-09-12 RX ADMIN — METHOCARBAMOL 500 MG: 500 TABLET ORAL at 08:09

## 2023-09-12 RX ADMIN — MIDAZOLAM HYDROCHLORIDE 2 MG: 1 INJECTION, SOLUTION INTRAMUSCULAR; INTRAVENOUS at 11:09

## 2023-09-12 RX ADMIN — DEXAMETHASONE SODIUM PHOSPHATE 4 MG: 4 INJECTION, SOLUTION INTRAMUSCULAR; INTRAVENOUS at 12:09

## 2023-09-12 RX ADMIN — ACETAMINOPHEN 1000 MG: 10 INJECTION, SOLUTION INTRAVENOUS at 03:09

## 2023-09-12 RX ADMIN — SODIUM CHLORIDE: 0.9 INJECTION, SOLUTION INTRAVENOUS at 11:09

## 2023-09-12 RX ADMIN — CEFAZOLIN 2 G: 330 INJECTION, POWDER, FOR SOLUTION INTRAMUSCULAR; INTRAVENOUS at 12:09

## 2023-09-12 RX ADMIN — HEPARIN SODIUM 2000 UNITS: 1000 INJECTION, SOLUTION INTRAVENOUS; SUBCUTANEOUS at 02:09

## 2023-09-12 NOTE — ANESTHESIA PROCEDURE NOTES
Intubation    Date/Time: 9/12/2023 11:56 AM    Performed by: Svitlana Sky CRNA  Authorized by: Luis Eduardo Mendoza III, MD    Intubation:     Induction:  Intravenous    Intubated:  Postinduction    Mask Ventilation:  Easy mask    Attempts:  1    Attempted By:  Student    Method of Intubation:  Direct    Blade:  Priti 3    Laryngeal View Grade: Grade I - full view of cords      Difficult Airway Encountered?: No      Complications:  None    Airway Device:  Oral endotracheal tube    Airway Device Size:  7.0    Style/Cuff Inflation:  Cuffed (inflated to minimal occlusive pressure)    Tube secured:  21    Secured at:  The lips    Placement Verified By:  Capnometry    Complicating Factors:  None    Findings Post-Intubation:  BS equal bilateral and atraumatic/condition of teeth unchanged

## 2023-09-12 NOTE — TRANSFER OF CARE
"Anesthesia Transfer of Care Note    Patient: Zulema Angulo    Procedure(s) Performed: Procedure(s) (LRB):  THROMBECTOMY, VENOUS, BILATERAL LOWER EXTREMITY, INFERIOR VENA CAVA, LEFT ILIAC VEIN (Bilateral)  VENOGRAM, LOWER EXTREMITY, BILATERAL (Bilateral)  STENT, LEFT ILIAC VEIN (Left)  STENT, INFERIOR VENA CAVA  VENOPLASTY, LEFT COMMON ILIAC VEIN (Left)    Patient location: PACU    Anesthesia Type: general    Transport from OR: Transported from OR on 6-10 L/min O2 by face mask with adequate spontaneous ventilation    Post pain: adequate analgesia    Post assessment: no apparent anesthetic complications    Post vital signs: stable    Level of consciousness: awake    Nausea/Vomiting: no nausea/vomiting    Complications: none    Transfer of care protocol was followed      Last vitals:   Visit Vitals  /77   Pulse 85   Temp 36.8 °C (98.2 °F) (Oral)   Resp 18   Ht 4' 10" (1.473 m)   Wt 72.6 kg (160 lb)   SpO2 99%   Breastfeeding No   BMI 33.44 kg/m²     "

## 2023-09-12 NOTE — NURSING TRANSFER
Nursing Transfer Note      9/12/2023   4:44 PM    Nurse giving handoff:Roma   Nurse receiving handoff:Deja    Reason patient is being transferred: PACU to [facility]    Transfer To: 1004    Transfer via bed    Transfer with N/A    Transported by PCTs    Transfer Vital Signs:  See MAR    Order for Tele Monitor? No    4eyes on Skin: yes    Medicines sent: none    Any special needs or follow-up needed: heparin drip started    Patient belongings transferred with patient: Yes    Chart send with patient: Yes    Notified: father    Patient reassessed at: [id number]

## 2023-09-12 NOTE — ANESTHESIA PREPROCEDURE EVALUATION
"                                                                                                             2023  Pre-operative evaluation for Procedure(s) (LRB):  THROMBECTOMY, VENOUS, BILATERAL LOWER EXTREMITY, INFERIOR VENA CAVA, POSSIBLE VENOPLASTY AND STENTING (Bilateral)    Zulema Angulo is a 23 y.o. 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      Patient Active Problem List   Diagnosis    Acute deep vein thrombosis (DVT) of non-extremity vein    Elevated blood pressure reading    IVC thrombosis    SIRS (systemic inflammatory response syndrome)       Review of patient's allergies indicates:  No Known Allergies    No current facility-administered medications on file prior to encounter.     Current Outpatient Medications on File Prior to Encounter   Medication Sig Dispense Refill    apixaban (ELIQUIS DVT-PE TREAT 30D START) 5 mg (74 tabs) DsPk For the first 7 days, take two 5 mg tablets twice daily.  After 7 days, take one 5 mg tablet twice daily. 74 tablet 0       No past surgical history on file.    BP Readings from Last 3 Encounters:   23 (!) 161/90   23 128/61         CBC: No results for input(s): "WBC", "RBC", "HGB", "HCT", "PLT", "MCV", "MCH", "MCHC" in the last 72 hours.    CMP: No results for input(s): "NA", "K", "CL", "CO2", "BUN", "CREATININE", "GLU", "MG", "PHOS", "CALCIUM", "ALBUMIN", "PROT", "ALKPHOS", "ALT", "AST", "BILITOT" in the last 72 hours.    INR  No results for input(s): "PT", "INR", "PROTIME", "APTT" in the last 72 hours.        Diagnostic Studies:      EKD Echo:  No results found for this or any previous visit.        Pre-op Assessment    I have reviewed the Patient Summary Reports.     I have reviewed the Nursing Notes. I have reviewed the NPO Status.      Review of Systems  Anesthesia Hx:  No previous Anesthesia    Hematology/Oncology:     Oncology " Normal   Hematology Comments: DVT   EENT/Dental:EENT/Dental Normal   Cardiovascular:   Hypertension    Pulmonary:  Pulmonary Normal    Renal/:  Renal/ Normal     Musculoskeletal:  Musculoskeletal Normal    Neurological:  Neurology Normal    Endocrine:  Endocrine Normal    Dermatological:  Skin Normal    Psych:  Psychiatric Normal           Physical Exam  General: Well nourished, Cooperative, Alert and Oriented    Airway:  Mallampati: II   Mouth Opening: Normal  Neck ROM: Normal ROM        Anesthesia Plan  Type of Anesthesia, risks & benefits discussed:    Anesthesia Type: Gen ETT  Intra-op Monitoring Plan: Standard ASA Monitors  Post Op Pain Control Plan: multimodal analgesia and IV/PO Opioids PRN  Induction:  IV  Informed Consent: Informed consent signed with the Patient and all parties understand the risks and agree with anesthesia plan.  All questions answered.   ASA Score: 2  Day of Surgery Review of History & Physical: H&P Update referred to the surgeon/provider.    Ready For Surgery From Anesthesia Perspective.     .

## 2023-09-12 NOTE — BRIEF OP NOTE
[facility] - Surgery ([facility])  Brief Operative Note    SUMMARY     Surgery Date: 9/12/2023     Surgeon(s) and Role:     * Rodrigo Pate MD - Primary          Co-surgeon: * Christian Darby MD    Pre-op Diagnosis:  IVC thrombosis [I82.220]  Acute deep vein thrombosis (DVT) of non-extremity vein [I82.90]    Post-op Diagnosis:  Post-Op Diagnosis Codes:     * IVC thrombosis [I82.220]     * Acute deep vein thrombosis (DVT) of non-extremity vein [I82.90]    Procedure(s) (LRB):  THROMBECTOMY, VENOUS, BILATERAL LOWER EXTREMITY, INFERIOR VENA CAVA, LEFT ILIAC VEIN (Bilateral)  VENOGRAM, LOWER EXTREMITY, BILATERAL (Bilateral)  STENT, LEFT ILIAC VEIN (Left)  STENT, INFERIOR VENA CAVA  VENOPLASTY, LEFT COMMON ILIAC VEIN (Left)    Anesthesia: General    Operative Findings: Chronic thrombus, removed with Clotriever IVC and L CIV, unable to cross L CIV safely, PTA and stenting 16-18 mm Venovo stents from L CFV to distal IVC    Estimated Blood Loss: 10 mL    Estimated Blood Loss has been documented.         Specimens:   Specimen (24h ago, onward)      None            [id number]

## 2023-09-12 NOTE — BRIEF OP NOTE
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   Thrombectomy of IVC and left iliac with Inari 16Fr Clotreiver.   Placement of [id number] Venovo stent in IVC and Left common iliac; [id number] Venovo stent in left iliac.     Estimated Blood Loss: 100 mL         Specimens:   Specimen (24h ago, onward)      None            [id number]

## 2023-09-12 NOTE — OP NOTE
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 VENOGRAM, LOWER EXTREMITY, BILATERAL (Bilateral)  4. STENT, LEFT ILIAC VEIN (Left) - 16X120 VENOVO STENT  5. STENT, INFERIOR VENA CAVA - 18X120 VENOVO STENT  6. VENOPLASTY, LEFT COMMON ILIAC VEIN (Left) - 8, 12 MM BALLOONS     PROCEDURE IN DETAIL:  Patient was seen preoperatively by anesthesia was deemed stable for surgery.  She was brought to the operating room placed prone on the operating table.  Appropriate IVs and arterial lines were placed.  She was prepped and draped in sterile fashion a time-out performed.  Ultrasound was used to access the bilateral popliteal vein bilaterally with a micropuncture technique and upsized to a 5 Chinese sheath.  The patient was systemically heparinized and re-dosed appropriately based upon ACT levels.  We are able to cross the left common iliac vein and IVC thrombus and confirmed appropriate location with venogram.  We then used an true vascular ultrasound to debbi the area of patent IVC above the level of the renal veins.  We performed thrombectomy with the clot retriever.  There was significant mid IVC stenosis preventing full removal of thrombus with our 3rd and 4th passage.  We identified that we had reopened a channel in the PAMELA caval system her left side.  We then turned our attention to the right side and despite all of our wires and catheters  we were unable to cross the common iliac vein right occlusion.  We then took a picture of the left side and noted to be rethrombosed for which we performed further thrombectomy ballooning and subsequently stenting to prevent rethrombosis.  All of our wires and catheters removed.  Sheath were removed and silk sutures were used to close the skin puncture sites and manual pressure was held for 10 minutes with no hematoma noted.  Patient was transferred recovery room in stable condition.    Anesthesia: General    Operative Findings: Chronic thrombus, removed with Clotriever IVC and L CIV, unable to cross L CIV safely, PTA and stenting 16-18 mm Venovo stents from L CFV to distal IVC    Estimated Blood Loss: 10 mL    Estimated Blood Loss has been documented.         Specimens:   Specimen (24h ago, onward)      None

## 2023-09-13 VITALS
HEIGHT: 58 IN | BODY MASS INDEX: 33.58 KG/M2 | DIASTOLIC BLOOD PRESSURE: 55 MMHG | RESPIRATION RATE: 16 BRPM | WEIGHT: 160 LBS | HEART RATE: 80 BPM | SYSTOLIC BLOOD PRESSURE: 108 MMHG | OXYGEN SATURATION: 98 % | TEMPERATURE: 98 F

## 2023-09-13 DIAGNOSIS — I82.90 ACUTE DEEP VEIN THROMBOSIS (DVT) OF NON-EXTREMITY VEIN: ICD-10-CM

## 2023-09-13 DIAGNOSIS — I82.220 IVC THROMBOSIS: Primary | ICD-10-CM

## 2023-09-13 LAB
ANION GAP SERPL CALC-SCNC: 12 MMOL/L (ref 8–16)
APTT PPP: 29.8 SEC (ref 21–32)
APTT PPP: 31.3 SEC (ref 21–32)
BASOPHILS # BLD AUTO: 0.01 K/UL (ref 0–0.2)
BASOPHILS # BLD AUTO: 0.01 K/UL (ref 0–0.2)
BASOPHILS NFR BLD: 0.1 % (ref 0–1.9)
BASOPHILS NFR BLD: 0.1 % (ref 0–1.9)
BUN SERPL-MCNC: 9 MG/DL (ref 6–20)
CALCIUM SERPL-MCNC: 8.9 MG/DL (ref 8.7–10.5)
CHLORIDE SERPL-SCNC: 106 MMOL/L (ref 95–110)
CO2 SERPL-SCNC: 19 MMOL/L (ref 23–29)
CREAT SERPL-MCNC: 0.7 MG/DL (ref 0.5–1.4)
DIFFERENTIAL METHOD: ABNORMAL
DIFFERENTIAL METHOD: ABNORMAL
EOSINOPHIL # BLD AUTO: 0 K/UL (ref 0–0.5)
EOSINOPHIL # BLD AUTO: 0 K/UL (ref 0–0.5)
EOSINOPHIL NFR BLD: 0 % (ref 0–8)
EOSINOPHIL NFR BLD: 0 % (ref 0–8)
ERYTHROCYTE [DISTWIDTH] IN BLOOD BY AUTOMATED COUNT: 19.6 % (ref 11.5–14.5)
ERYTHROCYTE [DISTWIDTH] IN BLOOD BY AUTOMATED COUNT: 19.6 % (ref 11.5–14.5)
EST. GFR  (NO RACE VARIABLE): >60 ML/MIN/1.73 M^2
GLUCOSE SERPL-MCNC: 139 MG/DL (ref 70–110)
HCT VFR BLD AUTO: 30 % (ref 37–48.5)
HCT VFR BLD AUTO: 30 % (ref 37–48.5)
HGB BLD-MCNC: 9 G/DL (ref 12–16)
HGB BLD-MCNC: 9 G/DL (ref 12–16)
IMM GRANULOCYTES # BLD AUTO: 0.04 K/UL (ref 0–0.04)
IMM GRANULOCYTES # BLD AUTO: 0.04 K/UL (ref 0–0.04)
IMM GRANULOCYTES NFR BLD AUTO: 0.4 % (ref 0–0.5)
IMM GRANULOCYTES NFR BLD AUTO: 0.4 % (ref 0–0.5)
LYMPHOCYTES # BLD AUTO: 1.4 K/UL (ref 1–4.8)
LYMPHOCYTES # BLD AUTO: 1.4 K/UL (ref 1–4.8)
LYMPHOCYTES NFR BLD: 12.7 % (ref 18–48)
LYMPHOCYTES NFR BLD: 12.7 % (ref 18–48)
MAGNESIUM SERPL-MCNC: 1.8 MG/DL (ref 1.6–2.6)
MCH RBC QN AUTO: 23.1 PG (ref 27–31)
MCH RBC QN AUTO: 23.1 PG (ref 27–31)
MCHC RBC AUTO-ENTMCNC: 30 G/DL (ref 32–36)
MCHC RBC AUTO-ENTMCNC: 30 G/DL (ref 32–36)
MCV RBC AUTO: 77 FL (ref 82–98)
MCV RBC AUTO: 77 FL (ref 82–98)
MONOCYTES # BLD AUTO: 0.4 K/UL (ref 0.3–1)
MONOCYTES # BLD AUTO: 0.4 K/UL (ref 0.3–1)
MONOCYTES NFR BLD: 3.8 % (ref 4–15)
MONOCYTES NFR BLD: 3.8 % (ref 4–15)
NEUTROPHILS # BLD AUTO: 9 K/UL (ref 1.8–7.7)
NEUTROPHILS # BLD AUTO: 9 K/UL (ref 1.8–7.7)
NEUTROPHILS NFR BLD: 83 % (ref 38–73)
NEUTROPHILS NFR BLD: 83 % (ref 38–73)
NRBC BLD-RTO: 0 /100 WBC
NRBC BLD-RTO: 0 /100 WBC
PHOSPHATE SERPL-MCNC: 3.7 MG/DL (ref 2.7–4.5)
PLATELET # BLD AUTO: 340 K/UL (ref 150–450)
PLATELET # BLD AUTO: 340 K/UL (ref 150–450)
PMV BLD AUTO: 9.4 FL (ref 9.2–12.9)
PMV BLD AUTO: 9.4 FL (ref 9.2–12.9)
POTASSIUM SERPL-SCNC: 3.8 MMOL/L (ref 3.5–5.1)
RBC # BLD AUTO: 3.89 M/UL (ref 4–5.4)
RBC # BLD AUTO: 3.89 M/UL (ref 4–5.4)
SODIUM SERPL-SCNC: 137 MMOL/L (ref 136–145)
WBC # BLD AUTO: 10.87 K/UL (ref 3.9–12.7)
WBC # BLD AUTO: 10.87 K/UL (ref 3.9–12.7)

## 2023-09-13 PROCEDURE — 85730 THROMBOPLASTIN TIME PARTIAL: CPT | Performed by: STUDENT IN AN ORGANIZED HEALTH CARE EDUCATION/TRAINING PROGRAM

## 2023-09-13 PROCEDURE — 25000003 PHARM REV CODE 250

## 2023-09-13 PROCEDURE — 36415 COLL VENOUS BLD VENIPUNCTURE: CPT | Performed by: STUDENT IN AN ORGANIZED HEALTH CARE EDUCATION/TRAINING PROGRAM

## 2023-09-13 PROCEDURE — 94761 N-INVAS EAR/PLS OXIMETRY MLT: CPT

## 2023-09-13 PROCEDURE — 85730 THROMBOPLASTIN TIME PARTIAL: CPT | Mod: 91 | Performed by: SURGERY

## 2023-09-13 PROCEDURE — 80048 BASIC METABOLIC PNL TOTAL CA: CPT | Performed by: STUDENT IN AN ORGANIZED HEALTH CARE EDUCATION/TRAINING PROGRAM

## 2023-09-13 PROCEDURE — 85025 COMPLETE CBC W/AUTO DIFF WBC: CPT | Performed by: STUDENT IN AN ORGANIZED HEALTH CARE EDUCATION/TRAINING PROGRAM

## 2023-09-13 PROCEDURE — 83735 ASSAY OF MAGNESIUM: CPT | Performed by: STUDENT IN AN ORGANIZED HEALTH CARE EDUCATION/TRAINING PROGRAM

## 2023-09-13 PROCEDURE — 84100 ASSAY OF PHOSPHORUS: CPT | Performed by: STUDENT IN AN ORGANIZED HEALTH CARE EDUCATION/TRAINING PROGRAM

## 2023-09-13 PROCEDURE — 25000003 PHARM REV CODE 250: Performed by: STUDENT IN AN ORGANIZED HEALTH CARE EDUCATION/TRAINING PROGRAM

## 2023-09-13 PROCEDURE — 36415 COLL VENOUS BLD VENIPUNCTURE: CPT | Performed by: SURGERY

## 2023-09-13 RX ORDER — ACETAMINOPHEN 500 MG
1000 TABLET ORAL EVERY 6 HOURS PRN
Qty: 30 TABLET | Refills: 0 | Status: SHIPPED | OUTPATIENT
Start: 2023-09-13

## 2023-09-13 RX ORDER — IBUPROFEN 400 MG/1
400 TABLET ORAL EVERY 6 HOURS PRN
Qty: 30 TABLET | Refills: 0 | Status: SHIPPED | OUTPATIENT
Start: 2023-09-13

## 2023-09-13 RX ORDER — ENOXAPARIN SODIUM 150 MG/ML
1 INJECTION SUBCUTANEOUS 2 TIMES DAILY
Qty: 24 ML | Refills: 0 | Status: SHIPPED | OUTPATIENT
Start: 2023-09-13

## 2023-09-13 RX ORDER — OXYCODONE HYDROCHLORIDE 5 MG/1
5 TABLET ORAL EVERY 6 HOURS PRN
Qty: 12 TABLET | Refills: 0 | Status: SHIPPED | OUTPATIENT
Start: 2023-09-13

## 2023-09-13 RX ADMIN — GABAPENTIN 300 MG: 300 CAPSULE ORAL at 09:09

## 2023-09-13 RX ADMIN — OXYCODONE HYDROCHLORIDE 5 MG: 5 TABLET ORAL at 11:09

## 2023-09-13 RX ADMIN — ACETAMINOPHEN 1000 MG: 500 TABLET ORAL at 05:09

## 2023-09-13 RX ADMIN — OXYCODONE HYDROCHLORIDE 5 MG: 5 TABLET ORAL at 12:09

## 2023-09-13 RX ADMIN — METHOCARBAMOL 500 MG: 500 TABLET ORAL at 09:09

## 2023-09-13 NOTE — NURSING
D/C instructions reviewed with pt and family, all questions answered. Teaching provided to pt's significant other regarding Lovenox injections. Pt's SO injected pt with first dose successfully without issue. IV d/c and pt left floor in stable condition via wheelchair to family at approx 1210.

## 2023-09-13 NOTE — ASSESSMENT & PLAN NOTE
Ms. 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    -OK for ambulation  -Incentive spirometer  -Regular diet  -OK to remove dressings  -OK for shower  -Pain meds as ordered     Dispo:  DC today on lovenox, follow up in clinic in two weeks

## 2023-09-13 NOTE — HOSPITAL COURSE
For details of hospital stay, please refer to daily progress notes. Briefly, this is a 23 y.o. 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 Patient was taken to OR and underwent thrombectomy and stent placement.  Post-operatively patient was admitted to the floor and had an uncomplicated hospital recovery.     On the day of discharge, the patient was ambulating without difficulty, voiding spontaneously, was tolerating a diet without nausea or vomiting, and pain was well controlled on PO pain medications. Discharge instructions were explained to the patient and appropriate follow-up was arranged

## 2023-09-13 NOTE — ANESTHESIA POSTPROCEDURE EVALUATION
Anesthesia Post Evaluation    Patient: Zulema O Blocker    Procedure(s) Performed: Procedure(s) (LRB):  THROMBECTOMY, VENOUS, BILATERAL LOWER EXTREMITY, INFERIOR VENA CAVA, LEFT ILIAC VEIN (Bilateral)  VENOGRAM, LOWER EXTREMITY, BILATERAL (Bilateral)  STENT, LEFT ILIAC VEIN (Left)  STENT, INFERIOR VENA CAVA  VENOPLASTY, LEFT COMMON ILIAC VEIN (Left)    Final Anesthesia Type: general      Patient location during evaluation: PACU  Patient participation: Yes- Able to Participate  Level of consciousness: awake and alert  Post-procedure vital signs: reviewed and stable  Pain management: adequate  Airway patency: patent    PONV status at discharge: No PONV  Anesthetic complications: no      Cardiovascular status: blood pressure returned to baseline  Respiratory status: unassisted  Hydration status: euvolemic  Follow-up not needed.          Vitals Value Taken Time   /55 09/13/23 0718   Temp 36.8 °C (98.2 °F) 09/13/23 0718   Pulse 80 09/13/23 0718   Resp 16 09/13/23 1112   SpO2 98 % 09/13/23 0718         Event Time   Out of Recovery 16:00:00         Pain/Jocelyn Score: Pain Rating Prior to Med Admin: 7 (9/13/2023 11:12 AM)  Pain Rating Post Med Admin: 8 (9/12/2023  6:25 PM)  Jocelyn Score: 9 (9/12/2023  4:00 PM)

## 2023-09-13 NOTE — NURSING
Patient arrived to unit in bed. VSS on room air. Respirations even and unlabored. 2 PIVs saline locked. Bilateral calf incisions with gauze and tegaderm clean dry and intact. Patient reporting 8/10 pain. Team contacted for PRN medication. Oxy 5 mg given to patient. After administration patient still reporting 9/10 pain. Team contacted again. One time dose of dilaudid given. Patient instructed to call if pain is not tolerable in 30 minutes after administration.

## 2023-09-13 NOTE — PLAN OF CARE
[facility] Plan of Care Note  Dx DVT    Shift Events Start Heparin    Goals of Care: pain management, Monitor Bleeding    Neuro: AAO X 4    Vital Signs: VSS    Respiratory: RA    Diet: Reg    Is patient tolerating current diet? yes    GTTS: Heparin @ 15 units/kg/hr /8.6     Urine Output/Bowel Movement: adequate urine output    Drains/Tubes/Tube Feeds (include total output/shift): none    Lines: 18 g R Fa and 20 R AC      Accuchecks: none    Skin: surgical incision to bilateral calves    Fall Risk Score: 8    Activity level? Up with assist    Any scheduled procedures? none    Any safety concerns? Fall precautions    Other: none     Problem: Adult Inpatient Plan of Care  Goal: Plan of Care Review  Outcome: Ongoing, Progressing  Goal: Patient-Specific Goal (Individualized)  Outcome: Ongoing, Progressing  Goal: Absence of Hospital-Acquired Illness or Injury  Outcome: Ongoing, Progressing  Goal: Optimal Comfort and Wellbeing  Outcome: Ongoing, Progressing  Goal: Readiness for Transition of Care  Outcome: Ongoing, Progressing

## 2023-09-13 NOTE — DISCHARGE SUMMARY
[facility]  Vascular Surgery  Discharge Summary      Patient Name: Zulema Angulo  MRN: [id number]  Admission Date: 9/12/2023  Hospital Length of Stay: 1 days  Discharge Date and Time:  09/13/2023 7:46 AM  Attending Physician: Rodrigo Pate MD   Discharging Provider: Marilou Pérez NP  Primary Care Provider: Abram Merrill MD    HPI:   No notes on file    Procedure(s) (LRB):  THROMBECTOMY, VENOUS, BILATERAL LOWER EXTREMITY, INFERIOR VENA CAVA, LEFT ILIAC VEIN (Bilateral)  VENOGRAM, LOWER EXTREMITY, BILATERAL (Bilateral)  STENT, LEFT ILIAC VEIN (Left)  STENT, INFERIOR VENA CAVA  VENOPLASTY, LEFT COMMON ILIAC VEIN (Left)     Hospital Course: For details of hospital stay, please refer to daily progress notes. Briefly, this is a 23 y.o. 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 Patient was taken to OR and underwent thrombectomy and stent placement.  Post-operatively patient was admitted to the floor and had an uncomplicated hospital recovery.     On the day of discharge, the patient was ambulating without difficulty, voiding spontaneously, was tolerating a diet without nausea or vomiting, and pain was well controlled on PO pain medications. Discharge instructions were explained to the patient and appropriate follow-up was arranged           Goals of Care Treatment Preferences:  Code Status: Full Code      Consults:     Significant Diagnostic Studies: Labs:   CMP   Recent Labs   Lab 09/13/23  0244      K 3.8      CO2 19*   *   BUN 9   CREATININE 0.7   CALCIUM 8.9   ANIONGAP 12    and CBC   Recent Labs   Lab 09/12/23  1632 09/13/23  0244   WBC 10.73 10.87  10.87   HGB 9.2* 9.0*  9.0*   HCT 30.7* 30.0*  30.0*    340  340   Physical Exam  Constitutional:       Appearance: Normal appearance.   HENT:      Head: Normocephalic and atraumatic.   Cardiovascular:       Rate and Rhythm: Normal rate and regular rhythm. 2+ PT bilaterally.    Pulmonary:      Effort: Pulmonary effort is normal.   Abdominal:      General: Abdomen is flat.      Tenderness: There is no abdominal tenderness.   Musculoskeletal:         General: No swelling.      Left lower leg: No edema.   Skin:     General: Skin is warm. Discoloration to LLE. 2+ LLE edema.     Capillary Refill: Capillary refill takes less than 2 seconds.       Bilateral posterior knee dressings, soft, c/d/i  Neurological:      General: No focal deficit present.      Mental Status: She is alert and oriented to person, place, and time.     Pending Diagnostic Studies:     None        Final Active Diagnoses:    Diagnosis Date Noted POA    PRINCIPAL PROBLEM:  Acute deep vein thrombosis (DVT) of non-extremity vein [I82.90] 08/18/2023 Yes      Problems Resolved During this Admission:      Discharged Condition: good    Disposition: Home or Self Care    Follow Up:   Follow-up Information     Rodrigo Pate MD Follow up in 2 week(s).    Specialties: Vascular Surgery, Surgery  Contact information:  [address]  [address] [place] [address]  [phone]                         Patient Instructions:      Diet Adult Regular     Notify your health care provider if you experience any of the following:     Notify your health care provider if you experience any of the following:  increased confusion or weakness     Notify your health care provider if you experience any of the following:  persistent dizziness, light-headedness, or visual disturbances     Notify your health care provider if you experience any of the following:  difficulty breathing or increased cough     Notify your health care provider if you experience any of the following:  severe persistent headache     Notify your health care provider if you experience any of the following:  worsening rash     Notify your health care provider if you experience any of the following:   redness, tenderness, or signs of infection (pain, swelling, redness, odor or green/yellow discharge around incision site)     Notify your health care provider if you experience any of the following:  severe uncontrolled pain     Notify your health care provider if you experience any of the following:  persistent nausea and vomiting or diarrhea     Notify your health care provider if you experience any of the following:  temperature >100.4     Change dressing (specify)   Order Comments: Take dressings off back of knees, okay to shower, leave open to ear     Activity as tolerated     Medications:  Reconciled Home Medications:      Medication List      START taking these medications    acetaminophen 500 MG tablet  Commonly known as: TYLENOL  Take 2 tablets (1,000 mg total) by mouth every 6 (six) hours as needed for Pain.     enoxaparin 120 mg/0.8 mL Syrg  Commonly known as: LOVENOX  Inject 0.5 mLs (75 mg total) into the skin 2 (two) times daily.     ibuprofen 400 MG tablet  Commonly known as: ADVIL,MOTRIN  Take 1 tablet (400 mg total) by mouth every 6 (six) hours as needed for Other (pain).     oxyCODONE 5 MG immediate release tablet  Commonly known as: ROXICODONE  Take 1 tablet (5 mg total) by mouth every 6 (six) hours as needed for Pain (severe pain).        STOP taking these medications    ELIQUIS DVT-PE TREAT 30D START 5 mg (74 tabs) Dspk  Generic drug: apixaban            Marilou Pérez NP  Vascular Surgery  [facility]

## 2023-09-13 NOTE — DISCHARGE INSTRUCTIONS
VASCULAR SURGERY DISCHARGE INSTRUCTIONS    Woundcare:  - Take your dressing off the day after your surgery and take a shower. Shower daily and pad your incision site dry  - It is normal to notice some bruising at your incision site in the first 1-3 days after surgery    Activity:  - Activity is good for you. Try to walk frequently and increase walking distance every day  - No heavy lifting for 2 weeks  - No baths, swimming in pools, lakes, jacuzzi etc. for 2 weeks     Diet:  -Resume your pre-operative home diet    Follow up:  -Follow up for ultrasound and vascular surgery clinic appointment in ~2 weeks    Call Vascular Surgery Office at [phone] if you experience:  -Increased redness, warmth, tenderness, or draining pus from your incision  -Increased pain/swelling at your incision  -Worsening fevers, chills, nausea/vomiting  -Pain, weakness, coldness, or numbness in your legs  -Uncontrolled pain  -Your call will be returned within 24 hours and further instructions will be provided    Go to ER/Urgent Care if you experience:  -Worsening shortness of breath or chest pain  -Sudden severe pain and swelling at your incision site

## 2023-09-18 ENCOUNTER — PATIENT MESSAGE (OUTPATIENT)
Dept: VASCULAR SURGERY | Facility: CLINIC | Age: 23
End: 2023-09-18
Payer: COMMERCIAL

## 2023-09-22 ENCOUNTER — PATIENT MESSAGE (OUTPATIENT)
Dept: VASCULAR SURGERY | Facility: CLINIC | Age: 23
End: 2023-09-22
Payer: COMMERCIAL

## 2023-09-22 DIAGNOSIS — M79.606 PAIN AND SWELLING OF LOWER EXTREMITY, UNSPECIFIED LATERALITY: Primary | ICD-10-CM

## 2023-09-22 DIAGNOSIS — M79.89 PAIN AND SWELLING OF LOWER EXTREMITY, UNSPECIFIED LATERALITY: Primary | ICD-10-CM

## 2023-09-22 DIAGNOSIS — I82.220 IVC THROMBOSIS: ICD-10-CM

## 2023-09-25 DIAGNOSIS — I82.90 ACUTE DEEP VEIN THROMBOSIS (DVT) OF NON-EXTREMITY VEIN: Primary | ICD-10-CM

## 2023-09-25 RX ORDER — ENOXAPARIN SODIUM 150 MG/ML
1 INJECTION SUBCUTANEOUS 2 TIMES DAILY
Qty: 1 EACH | Refills: 1 | Status: SHIPPED | OUTPATIENT
Start: 2023-09-25 | End: 2023-10-10

## 2023-10-06 ENCOUNTER — HOSPITAL ENCOUNTER (OUTPATIENT)
Dept: RADIOLOGY | Facility: OTHER | Age: 23
Discharge: HOME | End: 2023-10-06
Attending: SURGERY
Payer: COMMERCIAL

## 2023-10-06 ENCOUNTER — OFFICE VISIT (OUTPATIENT)
Dept: VASCULAR SURGERY | Facility: CLINIC | Age: 23
End: 2023-10-06
Payer: COMMERCIAL

## 2023-10-06 VITALS
BODY MASS INDEX: 33.6 KG/M2 | HEART RATE: 104 BPM | WEIGHT: 160.06 LBS | HEIGHT: 58 IN | DIASTOLIC BLOOD PRESSURE: 69 MMHG | SYSTOLIC BLOOD PRESSURE: 133 MMHG

## 2023-10-06 DIAGNOSIS — M79.606 PAIN AND SWELLING OF LOWER EXTREMITY, UNSPECIFIED LATERALITY: ICD-10-CM

## 2023-10-06 DIAGNOSIS — I82.220 IVC THROMBOSIS: ICD-10-CM

## 2023-10-06 DIAGNOSIS — I82.90 ACUTE DEEP VEIN THROMBOSIS (DVT) OF NON-EXTREMITY VEIN: ICD-10-CM

## 2023-10-06 DIAGNOSIS — M79.89 PAIN AND SWELLING OF LOWER EXTREMITY, UNSPECIFIED LATERALITY: ICD-10-CM

## 2023-10-06 DIAGNOSIS — I82.521 CHRONIC DEEP VEIN THROMBOSIS (DVT) OF RIGHT ILIAC VEIN: Primary | ICD-10-CM

## 2023-10-06 PROCEDURE — 3078F PR MOST RECENT DIASTOLIC BLOOD PRESSURE < 80 MM HG: ICD-10-PCS | Mod: CPTII,S$GLB,, | Performed by: SURGERY

## 2023-10-06 PROCEDURE — 3075F PR MOST RECENT SYSTOLIC BLOOD PRESS GE 130-139MM HG: ICD-10-PCS | Mod: CPTII,S$GLB,, | Performed by: SURGERY

## 2023-10-06 PROCEDURE — 3044F PR MOST RECENT HEMOGLOBIN A1C LEVEL <7.0%: ICD-10-PCS | Mod: CPTII,S$GLB,, | Performed by: SURGERY

## 2023-10-06 PROCEDURE — 99214 OFFICE O/P EST MOD 30 MIN: CPT | Mod: S$GLB,,, | Performed by: SURGERY

## 2023-10-06 PROCEDURE — 93970 US LOWER EXTREMITY VEINS BILATERAL: ICD-10-PCS | Mod: 26,,, | Performed by: STUDENT IN AN ORGANIZED HEALTH CARE EDUCATION/TRAINING PROGRAM

## 2023-10-06 PROCEDURE — 1111F DSCHRG MED/CURRENT MED MERGE: CPT | Mod: CPTII,S$GLB,, | Performed by: SURGERY

## 2023-10-06 PROCEDURE — 1159F MED LIST DOCD IN RCRD: CPT | Mod: CPTII,S$GLB,, | Performed by: SURGERY

## 2023-10-06 PROCEDURE — 93970 EXTREMITY STUDY: CPT | Mod: TC

## 2023-10-06 PROCEDURE — 3075F SYST BP GE 130 - 139MM HG: CPT | Mod: CPTII,S$GLB,, | Performed by: SURGERY

## 2023-10-06 PROCEDURE — 93970 EXTREMITY STUDY: CPT | Mod: 26,,, | Performed by: STUDENT IN AN ORGANIZED HEALTH CARE EDUCATION/TRAINING PROGRAM

## 2023-10-06 PROCEDURE — 3008F BODY MASS INDEX DOCD: CPT | Mod: CPTII,S$GLB,, | Performed by: SURGERY

## 2023-10-06 PROCEDURE — 99214 PR OFFICE/OUTPT VISIT, EST, LEVL IV, 30-39 MIN: ICD-10-PCS | Mod: S$GLB,,, | Performed by: SURGERY

## 2023-10-06 PROCEDURE — 1159F PR MEDICATION LIST DOCUMENTED IN MEDICAL RECORD: ICD-10-PCS | Mod: CPTII,S$GLB,, | Performed by: SURGERY

## 2023-10-06 PROCEDURE — 3008F PR BODY MASS INDEX (BMI) DOCUMENTED: ICD-10-PCS | Mod: CPTII,S$GLB,, | Performed by: SURGERY

## 2023-10-06 PROCEDURE — 1111F PR DISCHARGE MEDS RECONCILED W/ CURRENT OUTPATIENT MED LIST: ICD-10-PCS | Mod: CPTII,S$GLB,, | Performed by: SURGERY

## 2023-10-06 PROCEDURE — 3044F HG A1C LEVEL LT 7.0%: CPT | Mod: CPTII,S$GLB,, | Performed by: SURGERY

## 2023-10-06 PROCEDURE — 3078F DIAST BP <80 MM HG: CPT | Mod: CPTII,S$GLB,, | Performed by: SURGERY

## 2023-10-06 NOTE — PROGRESS NOTES
History of Present Illness: Zulema Angulo is a 23 year old female who is 6 weeks postpartum who presents to the emergency department with complaint of lower abdominal pain, migraines, and subjective fever (<100.4).      She initially presented to a hospital in [place] on 8/12 for migraines and back pain. 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 CTA PE was negative at that time. She was hospitalized and started on heparin which was transitioned to lovenox to bridge to coumadin (due to plan for breast feeding).      Since previous evaluation, the patient denies any leg swelling or difficulty breathing. A repeat CT scan was performed today which shows extension of thrombus into left external iliac and common femoral veins. Her INR has been subtherapeutic at outpatient follow up, her dose of coumadin was increased, INR remains subtherapeutic at 1.4 today. The patient is no longer breast feeding.      Denies numbness or weakness of lower extremities.      9/2023:  Presents with c/o LLE edema, pain and discoloration.  No weakness or sensory changes.  On Eliquis.  Recent acute DVT developed of L EIV and CFV subtherapeutic on Coumadin.  +Heterozygote Factor V Leiden.    10/2023:  s/p THROMBECTOMY, VENOUS, BILATERAL LOWER EXTREMITY, INFERIOR VENA CAVA, LEFT ILIAC VEIN, WITH IVC AND L CIV VENOVO STENT PLACEMENT 9/2023.  COMPLIANT WITH LOVENOX.  BLE EDEMA GREATLY IMPROVED.        Review of patient's allergies indicates:  No Known Allergies     No past medical history on file.  No past surgical history on file.  Family History    None              Tobacco Use    Smoking status: Not on file    Smokeless tobacco: Not on file   Substance and Sexual Activity    Alcohol use: Not on file    Drug use: Not on file    Sexual activity: Not on file      Review of Systems    Constitutional:   Negative for chills and fatigue.   Respiratory:  Negative for shortness of breath.    Cardiovascular:  Negative for chest pain.   Gastrointestinal:  No abdominal pain  Skin:  Negative for color change and wound.   Neurological:  No Headache  All other systems reviewed and are negative.     Objective:      Vital Signs (Most Recent):  Temp: 98.1 °F (36.7 °C) (09/03/23 0800)  Pulse: 87 (09/03/23 0732)  Resp: 18 (09/03/23 0732)  BP: 112/68 (09/03/23 0732)  SpO2: 99 % (09/03/23 0732) Vital Signs (24h Range):  Temp:  [98.1 °F (36.7 °C)-99.6 °F (37.6 °C)] 98.1 °F (36.7 °C)  Pulse:  [] 87  Resp:  [13-20] 18  SpO2:  [94 %-100 %] 99 %  BP: (109-130)/(60-84) 112/68      Weight: 72.6 kg (160 lb)  Body mass index is 33.44 kg/m².      Physical Exam  Constitutional:       Appearance: Normal appearance.   HENT:      Head: Normocephalic and atraumatic.   Cardiovascular:      Rate and Rhythm: Normal rate and regular rhythm. 2+ PT bilaterally.    Pulmonary:      Effort: Pulmonary effort is normal.   Abdominal:      General: Abdomen is flat.      Tenderness: There is no abdominal tenderness.   Musculoskeletal:         General: No swelling.      Left lower leg: No edema.   Skin:     General: Skin is warm. Improved BLE edema.     Capillary Refill: Capillary refill takes less than 2 seconds.   Neurological:      General: No focal deficit present.      Mental Status: She is alert and oriented to person, place, and time.            Significant Labs:  Recent Lab Results  (Last 5 results in the past 24 hours)          09/03/23  0841   09/03/23  0504   09/02/23  1950   09/02/23  1945   09/02/23  1942         Appearance, UA             Hazy              Bacteria, UA             Few              Bilirubin (UA)             Negative              Color, UA             Yellow              Estimated Avg Glucose     85                      Glucose, UA             Negative              Hemoglobin A1C External     4.6  Comment:  ADA Screening Guidelines:  5.7-6.4%  Consistent with prediabetes  >or=6.5%  Consistent with diabetes     High levels of fetal hemoglobin interfere with the HbA1C  assay. Heterozygous hemoglobin variants (HbS, HgC, etc)do  not significantly interfere with this assay.   However, presence of multiple variants may affect accuracy.                         INR 1.3  Comment: Coumadin Therapy:  2.0 - 3.0 for INR for all indicators except mechanical heart valves  and antiphospholipid syndromes which should use 2.5 - 3.5.                             Ketones, UA             Negative              Leukocytes, UA             Trace              Microscopic Comment             SEE COMMENT  Comment: Other formed elements not mentioned in the report are not   present in the microscopic examination.                  NITRITE UA             Negative              Occult Blood UA             Negative              pH, UA             6.0              Preg Test, Ur         Negative                  Protein, UA             Negative  Comment: Recommend a 24 hour urine protein or a urine   protein/creatinine ratio if globulin induced proteinuria is  clinically suspected.                 Protime 13.8                           Acceptable         Yes                  RBC, UA             1              SARS-CoV-2 RNA, Amplification, Qual                 Negative  Comment: This test utilizes isothermal nucleic acid amplification technology   to   detect the SARS-CoV-2 RdRp nucleic acid segment. The analytical   sensitivity   (limit of detection) is 500 copies/swab.      A POSITIVE result is indicative of the presence of SARS-CoV-2 RNA;   clinical   correlation with patient history and other diagnostic information is   necessary to determine patient infection status.     A NEGATIVE result means that SARS-CoV-2 nucleic acids are not present   above   the limit of detection. A NEGATIVE result should be treated as   presumptive.   It does  not rule out the possibility of COVID-19 and should not be   the sole   basis for treatment decisions. If COVID-19 is strongly suspected   based on   clinical and exposure history, re-testing using an alternate   molecular assay   should be considered.      This test is only for use under the Food and Drug Administration s   Emergency   Use Authorization (EUA).      Commercial kits are provided by [employer]. Performance   characteristics of the EUA have been independently verified by   [facility] Department of Pathology and Laboratory Medicine.   _________________________________________________________________   The authorized Fact Sheet for Healthcare Providers and the authorized   Fact   Sheet for Patients of the ID NOW COVID-19 are available on the FDA   website:   https://www.fda.gov/media/171224/download   https://www.fda.gov/media/760798/download             Specific Gravity, UA             1.020              Specimen UA             Urine, Clean Catch              Squam Epithel, UA             14              Troponin I     <0.006  Comment: The reference interval for Troponin I represents the 99th percentile   cutoff   for our facility and is consistent with 3rd generation assay   performance.                         TSH     1.530                      WBC, UA             7                                                     Significant Diagnostics:  I have reviewed all pertinent imaging results/findings.     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  At this point, the thrombus is subacute, greater than 2 weeks since initial diagnosis.  Development of acute thrombus from previous evaluation as a result of subtherapeutic range of INR while on Coumadin.      -recovering well  -cont anticoagulation per Hematology  -recommend  compression with Rx stockings, elevation, dietary changes associated with water and sodium intake discussed at length with patient  -RTC 3-6 mo with IVC and iliac vein/BLE venous US    I spent 11 minutes evaluating this patient and greater than 50% of the time was spent counseling, coordinator care and discussing the plan of care.  All questions were answered and patient stated understanding with agreement with the above treatment plan.    Rodrigo Pate M.D., R.P.VJOSEFA [facility]

## 2023-10-13 DIAGNOSIS — I82.220 IVC THROMBOSIS: Primary | ICD-10-CM

## 2023-10-13 DIAGNOSIS — I82.521 CHRONIC DEEP VEIN THROMBOSIS (DVT) OF RIGHT ILIAC VEIN: ICD-10-CM

## 2023-10-28 DIAGNOSIS — I82.220 IVC THROMBOSIS: Primary | ICD-10-CM

## 2023-10-28 DIAGNOSIS — I82.521 CHRONIC DEEP VEIN THROMBOSIS (DVT) OF RIGHT ILIAC VEIN: ICD-10-CM

## 2023-10-31 DIAGNOSIS — I82.220 IVC THROMBOSIS: ICD-10-CM

## 2023-10-31 DIAGNOSIS — I82.90 ACUTE DEEP VEIN THROMBOSIS (DVT) OF NON-EXTREMITY VEIN: Primary | ICD-10-CM

## 2023-12-21 ENCOUNTER — PATIENT MESSAGE (OUTPATIENT)
Dept: VASCULAR SURGERY | Facility: CLINIC | Age: 23
End: 2023-12-21
Payer: COMMERCIAL

## 2023-12-27 ENCOUNTER — OFFICE VISIT (OUTPATIENT)
Dept: VASCULAR SURGERY | Facility: CLINIC | Age: 23
End: 2023-12-27
Payer: COMMERCIAL

## 2023-12-27 DIAGNOSIS — M79.606 PAIN AND SWELLING OF LOWER EXTREMITY, UNSPECIFIED LATERALITY: ICD-10-CM

## 2023-12-27 DIAGNOSIS — I82.521 CHRONIC DEEP VEIN THROMBOSIS (DVT) OF RIGHT ILIAC VEIN: ICD-10-CM

## 2023-12-27 DIAGNOSIS — I82.90 ACUTE DEEP VEIN THROMBOSIS (DVT) OF NON-EXTREMITY VEIN: Primary | ICD-10-CM

## 2023-12-27 DIAGNOSIS — M79.89 PAIN AND SWELLING OF LOWER EXTREMITY, UNSPECIFIED LATERALITY: ICD-10-CM

## 2023-12-27 PROCEDURE — 3044F PR MOST RECENT HEMOGLOBIN A1C LEVEL <7.0%: ICD-10-PCS | Mod: CPTII,95,, | Performed by: SURGERY

## 2023-12-27 PROCEDURE — 3044F HG A1C LEVEL LT 7.0%: CPT | Mod: CPTII,95,, | Performed by: SURGERY

## 2023-12-27 PROCEDURE — 99214 OFFICE O/P EST MOD 30 MIN: CPT | Mod: 95,,, | Performed by: SURGERY

## 2023-12-27 PROCEDURE — 99214 PR OFFICE/OUTPT VISIT, EST, LEVL IV, 30-39 MIN: ICD-10-PCS | Mod: 95,,, | Performed by: SURGERY

## 2023-12-27 NOTE — PROGRESS NOTES
History of Present Illness: Zulema Angulo is a 23 year old female who is 6 weeks postpartum who presents to the emergency department with complaint of lower abdominal pain, migraines, and subjective fever (<100.4).      She initially presented to a hospital in [place] on 8/12 for migraines and back pain. 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 CTA PE was negative at that time. She was hospitalized and started on heparin which was transitioned to lovenox to bridge to coumadin (due to plan for breast feeding).      Since previous evaluation, the patient denies any leg swelling or difficulty breathing. A repeat CT scan was performed today which shows extension of thrombus into left external iliac and common femoral veins. Her INR has been subtherapeutic at outpatient follow up, her dose of coumadin was increased, INR remains subtherapeutic at 1.4 today. The patient is no longer breast feeding.      Denies numbness or weakness of lower extremities.      9/2023:  Presents with c/o LLE edema, pain and discoloration.  No weakness or sensory changes.  On Eliquis.  Recent acute DVT developed of L EIV and CFV subtherapeutic on Coumadin.  +Heterozygote Factor V Leiden.    10/2023:  s/p THROMBECTOMY, VENOUS, BILATERAL LOWER EXTREMITY, INFERIOR VENA CAVA, LEFT ILIAC VEIN, WITH IVC AND L CIV VENOVO STENT PLACEMENT 9/2023.  COMPLIANT WITH LOVENOX.  BLE EDEMA GREATLY IMPROVED.    The patient location is: Work  The chief complaint leading to consultation is: BLE edema    Visit type: audiovisual    Face to Face time with patient:   15 minutes of total time spent on the encounter, which includes face to face time and non-face to face time preparing to see the patient (eg, review of tests), Obtaining and/or reviewing separately obtained history, Documenting clinical information  in the electronic or other health record, Independently interpreting results (not separately reported) and communicating results to the patient/family/caregiver, or Care coordination (not separately reported).         Each patient to whom he or she provides medical services by telemedicine is:  (1) informed of the relationship between the physician and patient and the respective role of any other health care provider with respect to management of the patient; and (2) notified that he or she may decline to receive medical services by telemedicine and may withdraw from such care at any time.    Notes:       12/2023:  +Eliquis BID.  C/o below knee bilateral edema.  +compression.  +excessive water intake x 5 wks        Review of patient's allergies indicates:  No Known Allergies     No past medical history on file.  No past surgical history on file.  Family History    None              Tobacco Use    Smoking status: Not on file    Smokeless tobacco: Not on file   Substance and Sexual Activity    Alcohol use: Not on file    Drug use: Not on file    Sexual activity: Not on file      Review of Systems   Constitutional:   Negative for chills and fatigue.   Respiratory:  Negative for shortness of breath.    Cardiovascular:  Negative for chest pain.   Gastrointestinal:  No abdominal pain  Skin:  Negative for color change and wound.   Neurological:  No Headache  All other systems reviewed and are negative.     Objective:      Vital Signs (Most Recent):  Temp: 98.1 °F (36.7 °C) (09/03/23 0800)  Pulse: 87 (09/03/23 0732)  Resp: 18 (09/03/23 0732)  BP: 112/68 (09/03/23 0732)  SpO2: 99 % (09/03/23 0732) Vital Signs (24h Range):  Temp:  [98.1 °F (36.7 °C)-99.6 °F (37.6 °C)] 98.1 °F (36.7 °C)  Pulse:  [] 87  Resp:  [13-20] 18  SpO2:  [94 %-100 %] 99 %  BP: (109-130)/(60-84) 112/68      Weight: 72.6 kg (160 lb)  Body mass index is 33.44 kg/m².      Physical Exam  Constitutional:       Appearance: Normal appearance.   HENT:       Head: Normocephalic and atraumatic.   Cardiovascular:      Rate and Rhythm: Normal rate and regular rhythm. 2+ PT bilaterally.    Pulmonary:      Effort: Pulmonary effort is normal.   Abdominal:      General: Abdomen is flat.      Tenderness: There is no abdominal tenderness.   Musculoskeletal:         General: No swelling.      Left lower leg: No edema.   Skin:     General: Skin is warm. Improved BLE edema.     Capillary Refill: Capillary refill takes less than 2 seconds.   Neurological:      General: No focal deficit present.      Mental Status: She is alert and oriented to person, place, and time.            Significant Labs:  Recent Lab Results  (Last 5 results in the past 24 hours)          09/03/23  0841   09/03/23  0504   09/02/23  1950   09/02/23  1945   09/02/23  1942         Appearance, UA             Hazy              Bacteria, UA             Few              Bilirubin (UA)             Negative              Color, UA             Yellow              Estimated Avg Glucose     85                      Glucose, UA             Negative              Hemoglobin A1C External     4.6  Comment: ADA Screening Guidelines:  5.7-6.4%  Consistent with prediabetes  >or=6.5%  Consistent with diabetes     High levels of fetal hemoglobin interfere with the HbA1C  assay. Heterozygous hemoglobin variants (HbS, HgC, etc)do  not significantly interfere with this assay.   However, presence of multiple variants may affect accuracy.                         INR 1.3  Comment: Coumadin Therapy:  2.0 - 3.0 for INR for all indicators except mechanical heart valves  and antiphospholipid syndromes which should use 2.5 - 3.5.                             Ketones, UA             Negative              Leukocytes, UA             Trace              Microscopic Comment             SEE COMMENT  Comment: Other formed elements not mentioned in the report are not   present in the microscopic examination.                  NITRITE UA              Negative              Occult Blood UA             Negative              pH, UA             6.0              Preg Test, Ur         Negative                  Protein, UA             Negative  Comment: Recommend a 24 hour urine protein or a urine   protein/creatinine ratio if globulin induced proteinuria is  clinically suspected.                 Protime 13.8                           Acceptable         Yes                  RBC, UA             1              SARS-CoV-2 RNA, Amplification, Qual                 Negative  Comment: This test utilizes isothermal nucleic acid amplification technology   to   detect the SARS-CoV-2 RdRp nucleic acid segment. The analytical   sensitivity   (limit of detection) is 500 copies/swab.      A POSITIVE result is indicative of the presence of SARS-CoV-2 RNA;   clinical   correlation with patient history and other diagnostic information is   necessary to determine patient infection status.     A NEGATIVE result means that SARS-CoV-2 nucleic acids are not present   above   the limit of detection. A NEGATIVE result should be treated as   presumptive.   It does not rule out the possibility of COVID-19 and should not be   the sole   basis for treatment decisions. If COVID-19 is strongly suspected   based on   clinical and exposure history, re-testing using an alternate   molecular assay   should be considered.      This test is only for use under the Food and Drug Administration s   Emergency   Use Authorization (EUA).      Commercial kits are provided by [employer]. Performance   characteristics of the EUA have been independently verified by   [facility] Department of Pathology and Laboratory Medicine.   _________________________________________________________________   The authorized Fact Sheet for Healthcare Providers and the authorized   Fact   Sheet for Patients of the ID NOW COVID-19 are available on the FDA   website:    https://www.fda.gov/media/385517/download   https://www.fda.gov/media/463660/download             Specific Gravity, UA             1.020              Specimen UA             Urine, Clean Catch              Squam Epithel, UA             14              Troponin I     <0.006  Comment: The reference interval for Troponin I represents the 99th percentile   cutoff   for our facility and is consistent with 3rd generation assay   performance.                         TSH     1.530                      WBC, UA             7                                                     Significant Diagnostics:  I have reviewed all pertinent imaging results/findings.     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  At this point, the thrombus is subacute, greater than 2 weeks since initial diagnosis.  Development of acute thrombus from previous evaluation as a result of subtherapeutic range of INR while on Coumadin.      -cont Eliquis per Hematology  -recommend compression with Rx stockings, elevation, dietary changes associated with water and sodium intake discussed at length with patient - monitor water intake  -RTC 2/7/24 mo with IVC and iliac vein/BLE venous US    I spent 11 minutes evaluating this patient and greater than 50% of the time was spent counseling, coordinator care and discussing the plan of care.  All questions were answered and patient stated understanding with agreement with the above treatment plan.    Rodrigo Pate M.D., R.P.V.I. [facility]

## 2024-02-07 ENCOUNTER — TELEPHONE (OUTPATIENT)
Dept: VASCULAR SURGERY | Facility: CLINIC | Age: 24
End: 2024-02-07
Payer: COMMERCIAL

## 2024-02-07 ENCOUNTER — HOSPITAL ENCOUNTER (OUTPATIENT)
Dept: RADIOLOGY | Facility: OTHER | Age: 24
Discharge: HOME | End: 2024-02-07
Attending: SURGERY
Payer: COMMERCIAL

## 2024-02-07 DIAGNOSIS — I82.220 IVC THROMBOSIS: ICD-10-CM

## 2024-02-07 DIAGNOSIS — I82.521 CHRONIC DEEP VEIN THROMBOSIS (DVT) OF RIGHT ILIAC VEIN: ICD-10-CM

## 2024-02-07 DIAGNOSIS — I82.90 ACUTE DEEP VEIN THROMBOSIS (DVT) OF NON-EXTREMITY VEIN: ICD-10-CM

## 2024-02-07 PROCEDURE — 93970 EXTREMITY STUDY: CPT | Mod: 26,,, | Performed by: RADIOLOGY

## 2024-02-07 PROCEDURE — 93970 EXTREMITY STUDY: CPT | Mod: TC

## 2024-02-07 NOTE — TELEPHONE ENCOUNTER
Called pt's mother regarding message sent to us this afternoon and being seen today. LVM with call back number.

## 2024-03-05 ENCOUNTER — TELEPHONE (OUTPATIENT)
Dept: VASCULAR SURGERY | Facility: CLINIC | Age: 24
End: 2024-03-05
Payer: COMMERCIAL

## 2024-03-05 NOTE — TELEPHONE ENCOUNTER
Called pt to confirm appt 3/8 for 2 mo f/up& US results done 2/7/24. Pt stated she will not be able to come in for appt because they live 2 hours away and have transportation issues. Pt stated she is not having any issues since her sx and recent US. Informed I would let the surgeon know, and we will be in touch if needed. Pt verb understanding.

## 2024-03-08 ENCOUNTER — OFFICE VISIT (OUTPATIENT)
Dept: VASCULAR SURGERY | Facility: CLINIC | Age: 24
End: 2024-03-08
Payer: COMMERCIAL

## 2024-03-08 DIAGNOSIS — I82.90 ACUTE DEEP VEIN THROMBOSIS (DVT) OF NON-EXTREMITY VEIN: ICD-10-CM

## 2024-03-08 DIAGNOSIS — I82.521 CHRONIC DEEP VEIN THROMBOSIS (DVT) OF RIGHT ILIAC VEIN: ICD-10-CM

## 2024-03-08 DIAGNOSIS — M79.606 PAIN AND SWELLING OF LOWER EXTREMITY, UNSPECIFIED LATERALITY: ICD-10-CM

## 2024-03-08 DIAGNOSIS — M79.89 PAIN AND SWELLING OF LOWER EXTREMITY, UNSPECIFIED LATERALITY: ICD-10-CM

## 2024-03-08 DIAGNOSIS — I82.220 IVC THROMBOSIS: Primary | ICD-10-CM

## 2024-03-08 PROCEDURE — 99214 OFFICE O/P EST MOD 30 MIN: CPT | Mod: 95,,, | Performed by: SURGERY

## 2024-03-08 NOTE — PROGRESS NOTES
History of Present Illness: Zulema Angulo is a 23 year old female who is 6 weeks postpartum who presents to the emergency department with complaint of lower abdominal pain, migraines, and subjective fever (<100.4).      She initially presented to a hospital in [place] on 8/12 for migraines and back pain. 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 CTA PE was negative at that time. She was hospitalized and started on heparin which was transitioned to lovenox to bridge to coumadin (due to plan for breast feeding).      Since previous evaluation, the patient denies any leg swelling or difficulty breathing. A repeat CT scan was performed today which shows extension of thrombus into left external iliac and common femoral veins. Her INR has been subtherapeutic at outpatient follow up, her dose of coumadin was increased, INR remains subtherapeutic at 1.4 today. The patient is no longer breast feeding.      Denies numbness or weakness of lower extremities.      9/2023:  Presents with c/o LLE edema, pain and discoloration.  No weakness or sensory changes.  On Eliquis.  Recent acute DVT developed of L EIV and CFV subtherapeutic on Coumadin.  +Heterozygote Factor V Leiden.    10/2023:  s/p THROMBECTOMY, VENOUS, BILATERAL LOWER EXTREMITY, INFERIOR VENA CAVA, LEFT ILIAC VEIN, WITH IVC AND L CIV VENOVO STENT PLACEMENT 9/2023.  COMPLIANT WITH LOVENOX.  BLE EDEMA GREATLY IMPROVED.    The patient location is: Work  The chief complaint leading to consultation is: BLE edema    Visit type: audiovisual    Face to Face time with patient:   15 minutes of total time spent on the encounter, which includes face to face time and non-face to face time preparing to see the patient (eg, review of tests), Obtaining and/or reviewing separately obtained history, Documenting clinical information  in the electronic or other health record, Independently interpreting results (not separately reported) and communicating results to the patient/family/caregiver, or Care coordination (not separately reported).         Each patient to whom he or she provides medical services by telemedicine is:  (1) informed of the relationship between the physician and patient and the respective role of any other health care provider with respect to management of the patient; and (2) notified that he or she may decline to receive medical services by telemedicine and may withdraw from such care at any time.    Notes:       12/2023:  +Eliquis BID.  C/o below knee bilateral edema.  +compression.  +excessive water intake x 5 wks.    3/2024:  Doing well, leg swelling much better.  Stopped ASA.        Review of patient's allergies indicates:  No Known Allergies     No past medical history on file.  No past surgical history on file.  Family History    None              Tobacco Use    Smoking status: Not on file    Smokeless tobacco: Not on file   Substance and Sexual Activity    Alcohol use: Not on file    Drug use: Not on file    Sexual activity: Not on file      Review of Systems   Constitutional:   Negative for chills and fatigue.   Respiratory:  Negative for shortness of breath.    Cardiovascular:  Negative for chest pain.   Gastrointestinal:  No abdominal pain  Skin:  Negative for color change and wound.   Neurological:  No Headache  All other systems reviewed and are negative.     Objective:      Vital Signs (Most Recent):  Temp: 98.1 °F (36.7 °C) (09/03/23 0800)  Pulse: 87 (09/03/23 0732)  Resp: 18 (09/03/23 0732)  BP: 112/68 (09/03/23 0732)  SpO2: 99 % (09/03/23 0732) Vital Signs (24h Range):  Temp:  [98.1 °F (36.7 °C)-99.6 °F (37.6 °C)] 98.1 °F (36.7 °C)  Pulse:  [] 87  Resp:  [13-20] 18  SpO2:  [94 %-100 %] 99 %  BP: (109-130)/(60-84) 112/68      Weight: 72.6 kg (160 lb)  Body mass index is 33.44 kg/m².      Physical  Exam  Constitutional:       Appearance: Normal appearance.   HENT:      Head: Normocephalic and atraumatic.   Cardiovascular:      Rate and Rhythm: Normal rate and regular rhythm. 2+ PT bilaterally.    Pulmonary:      Effort: Pulmonary effort is normal.   Abdominal:      General: Abdomen is flat.      Tenderness: There is no abdominal tenderness.   Musculoskeletal:         General: No swelling.      Left lower leg: No edema.   Skin:     General: Skin is warm. Improved BLE edema.     Capillary Refill: Capillary refill takes less than 2 seconds.   Neurological:      General: No focal deficit present.      Mental Status: She is alert and oriented to person, place, and time.            Significant Labs:  Recent Lab Results  (Last 5 results in the past 24 hours)          09/03/23  0841   09/03/23  0504   09/02/23  1950   09/02/23  1945   09/02/23  1942         Appearance, UA             Hazy              Bacteria, UA             Few              Bilirubin (UA)             Negative              Color, UA             Yellow              Estimated Avg Glucose     85                      Glucose, UA             Negative              Hemoglobin A1C External     4.6  Comment: ADA Screening Guidelines:  5.7-6.4%  Consistent with prediabetes  >or=6.5%  Consistent with diabetes     High levels of fetal hemoglobin interfere with the HbA1C  assay. Heterozygous hemoglobin variants (HbS, HgC, etc)do  not significantly interfere with this assay.   However, presence of multiple variants may affect accuracy.                         INR 1.3  Comment: Coumadin Therapy:  2.0 - 3.0 for INR for all indicators except mechanical heart valves  and antiphospholipid syndromes which should use 2.5 - 3.5.                             Ketones, UA             Negative              Leukocytes, UA             Trace              Microscopic Comment             SEE COMMENT  Comment: Other formed elements not mentioned in the report are not   present in  the microscopic examination.                  NITRITE UA             Negative              Occult Blood UA             Negative              pH, UA             6.0              Preg Test, Ur         Negative                  Protein, UA             Negative  Comment: Recommend a 24 hour urine protein or a urine   protein/creatinine ratio if globulin induced proteinuria is  clinically suspected.                 Protime 13.8                           Acceptable         Yes                  RBC, UA             1              SARS-CoV-2 RNA, Amplification, Qual                 Negative  Comment: This test utilizes isothermal nucleic acid amplification technology   to   detect the SARS-CoV-2 RdRp nucleic acid segment. The analytical   sensitivity   (limit of detection) is 500 copies/swab.      A POSITIVE result is indicative of the presence of SARS-CoV-2 RNA;   clinical   correlation with patient history and other diagnostic information is   necessary to determine patient infection status.     A NEGATIVE result means that SARS-CoV-2 nucleic acids are not present   above   the limit of detection. A NEGATIVE result should be treated as   presumptive.   It does not rule out the possibility of COVID-19 and should not be   the sole   basis for treatment decisions. If COVID-19 is strongly suspected   based on   clinical and exposure history, re-testing using an alternate   molecular assay   should be considered.      This test is only for use under the Food and Drug Administration s   Emergency   Use Authorization (EUA).      Commercial kits are provided by [employer]. Performance   characteristics of the EUA have been independently verified by   [facility] Department of Pathology and Laboratory Medicine.   _________________________________________________________________   The authorized Fact Sheet for Healthcare Providers and the authorized   Fact   Sheet for Patients of the ID NOW  COVID-19 are available on the FDA   website:   https://www.fda.gov/media/692928/download   https://www.fda.gov/media/448108/download             Specific Gravity, UA             1.020              Specimen UA             Urine, Clean Catch              Squam Epithel, UA             14              Troponin I     <0.006  Comment: The reference interval for Troponin I represents the 99th percentile   cutoff   for our facility and is consistent with 3rd generation assay   performance.                         TSH     1.530                      WBC, UA             7                                                     Significant Diagnostics:  I have reviewed all pertinent imaging results/findings.    2/2024  FINDINGS:  Right thigh veins: The common femoral, femoral, popliteal, upper greater saphenous, and deep femoral veins are patent and free of thrombus. The veins are compressible and have normal augmentation response.     Right calf veins: The visualized calf veins are patent.     Left thigh veins: The common femoral, femoral, popliteal, upper greater saphenous, and deep femoral veins are patent and free of thrombus. The veins are compressible and have normal augmentation response.     Left calf veins: The visualized calf veins are patent.     Miscellaneous: Stent in the left iliac and common femoral veins is patent.  There appears to be residual thrombus in the mid IVC.     Impression:     No evidence of acute deep venous thrombosis in either lower extremity.     Residual thrombus mid IVC.     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  At this point, the thrombus is subacute, greater than 2 weeks since initial diagnosis.  Development of acute thrombus from previous evaluation as a result of subtherapeutic range of INR while on Coumadin.      -cont Eliquis per  Hematology  -recommend compression with Rx stockings, elevation, dietary changes associated with water and sodium intake discussed at length with patient - monitor water intake  -RTC 9/2024 mo with IVC and iliac vein/BLE venous US    I spent 11 minutes evaluating this patient and greater than 50% of the time was spent counseling, coordinator care and discussing the plan of care.  All questions were answered and patient stated understanding with agreement with the above treatment plan.    Rodrigo Pate M.D., R.P.V.[facility]  [facility]

## 2024-03-12 DIAGNOSIS — I82.521 CHRONIC DEEP VEIN THROMBOSIS (DVT) OF RIGHT ILIAC VEIN: Primary | ICD-10-CM

## 2024-03-12 DIAGNOSIS — I82.220 IVC THROMBOSIS: ICD-10-CM

## 2024-03-28 ENCOUNTER — PATIENT MESSAGE (OUTPATIENT)
Dept: VASCULAR SURGERY | Facility: CLINIC | Age: 24
End: 2024-03-28
Payer: COMMERCIAL

## 2024-07-12 ENCOUNTER — PATIENT MESSAGE (OUTPATIENT)
Dept: VASCULAR SURGERY | Facility: CLINIC | Age: 24
End: 2024-07-12
Payer: COMMERCIAL

## 2024-07-15 DIAGNOSIS — I82.220 IVC THROMBOSIS: ICD-10-CM

## 2024-07-15 DIAGNOSIS — I82.521 CHRONIC DEEP VEIN THROMBOSIS (DVT) OF RIGHT ILIAC VEIN: Primary | ICD-10-CM

## 2024-08-05 ENCOUNTER — TELEPHONE (OUTPATIENT)
Dept: VASCULAR SURGERY | Facility: CLINIC | Age: 24
End: 2024-08-05
Payer: COMMERCIAL

## 2024-08-09 ENCOUNTER — OFFICE VISIT (OUTPATIENT)
Dept: VASCULAR SURGERY | Facility: CLINIC | Age: 24
End: 2024-08-09
Payer: COMMERCIAL

## 2024-08-09 DIAGNOSIS — I82.220 IVC THROMBOSIS: ICD-10-CM

## 2024-08-09 DIAGNOSIS — I82.90 ACUTE DEEP VEIN THROMBOSIS (DVT) OF NON-EXTREMITY VEIN: ICD-10-CM

## 2024-08-09 DIAGNOSIS — I82.521 CHRONIC DEEP VEIN THROMBOSIS (DVT) OF RIGHT ILIAC VEIN: Primary | ICD-10-CM

## (undated) DEVICE — GUIDEWIRE AMPLATZ STIFF 260X7

## (undated) DEVICE — Device

## (undated) DEVICE — SOL NS 1000CC

## (undated) DEVICE — DRESSING TRANS 4X4 TEGADERM

## (undated) DEVICE — GUIDEWIRE STF .035X180CM ANG

## (undated) DEVICE — TUBING HIGH PRESSURE

## (undated) DEVICE — DILATOR VES COONS .038X16X20CM

## (undated) DEVICE — GUIDEWIRE STF .035X180CM STR

## (undated) DEVICE — IMPLANTABLE DEVICE: Type: IMPLANTABLE DEVICE | Site: VEIN | Status: NON-FUNCTIONAL

## (undated) DEVICE — CATH VISIONS PV IVUS .035

## (undated) DEVICE — CATH BEREN SOFT VU 5FR 65CM

## (undated) DEVICE — COVERS PROBE NR-48 STERILE

## (undated) DEVICE — DRAPE U SPLIT SHEET 54X76IN

## (undated) DEVICE — FLOWSWITCH HP 1-W W/O LL

## (undated) DEVICE — VALVE CONTROL COPILOT

## (undated) DEVICE — KIT INTRO MICRO NIT VSI 4FR

## (undated) DEVICE — INTRODUCER VASC RADPQ 8FRX10CM

## (undated) DEVICE — CATH CLOTTRIEVER BOLD 105CM

## (undated) DEVICE — INTRODUCER VASC RADPQ 5FRX10CM

## (undated) DEVICE — DRAPE FEMORAL 82 X 124 IN

## (undated) DEVICE — VISE RADIFOCUS MULTI TORQUE

## (undated) DEVICE — INFLATOR ENCORE

## (undated) DEVICE — GUIDEWIRE ADVNTG 035X260CM ANG

## (undated) DEVICE — SYR MED RAD 150ML

## (undated) DEVICE — SHEATH CLOTTRIEVER 15CM 16FR

## (undated) DEVICE — SYS LABEL CORRECT MED

## (undated) DEVICE — DILATOR SET 16/18

## (undated) DEVICE — DECANTER FLUID TRNSF WHITE 9IN

## (undated) DEVICE — COVER INSTR ELASTIC BAND 40X20

## (undated) DEVICE — DILATOR COONS TAPER 14FR

## (undated) DEVICE — GUIDEWIRE STF .035X260CM ANG

## (undated) DEVICE — SPONGE DERMACEA 4X4IN 12PLY

## (undated) DEVICE — CATH DIAG ANG 4FX120 SLIP

## (undated) DEVICE — BLLN DORADO 8 X 100 X 80